# Patient Record
Sex: MALE | Race: WHITE | NOT HISPANIC OR LATINO | Employment: FULL TIME | URBAN - METROPOLITAN AREA
[De-identification: names, ages, dates, MRNs, and addresses within clinical notes are randomized per-mention and may not be internally consistent; named-entity substitution may affect disease eponyms.]

---

## 2023-03-13 DIAGNOSIS — Z00.00 ENCOUNTER FOR GENERAL ADULT MEDICAL EXAMINATION WITHOUT ABNORMAL FINDINGS: ICD-10-CM

## 2023-03-14 RX ORDER — TRAZODONE HYDROCHLORIDE 50 MG/1
TABLET ORAL
Qty: 180 TABLET | Refills: 1 | Status: SHIPPED | OUTPATIENT
Start: 2023-03-14 | End: 2023-09-11

## 2023-03-18 LAB
ALANINE AMINOTRANSFERASE (SGPT) (U/L) IN SER/PLAS: 22 U/L (ref 10–52)
ALBUMIN (G/DL) IN SER/PLAS: 4.7 G/DL (ref 3.4–5)
ALKALINE PHOSPHATASE (U/L) IN SER/PLAS: 62 U/L (ref 33–120)
ANION GAP IN SER/PLAS: 12 MMOL/L (ref 10–20)
ASPARTATE AMINOTRANSFERASE (SGOT) (U/L) IN SER/PLAS: 22 U/L (ref 9–39)
BILIRUBIN TOTAL (MG/DL) IN SER/PLAS: 0.9 MG/DL (ref 0–1.2)
CALCIUM (MG/DL) IN SER/PLAS: 9.4 MG/DL (ref 8.6–10.3)
CARBON DIOXIDE, TOTAL (MMOL/L) IN SER/PLAS: 31 MMOL/L (ref 21–32)
CHLORIDE (MMOL/L) IN SER/PLAS: 103 MMOL/L (ref 98–107)
CHOLESTEROL (MG/DL) IN SER/PLAS: 109 MG/DL (ref 0–199)
CHOLESTEROL IN HDL (MG/DL) IN SER/PLAS: 52.2 MG/DL
CHOLESTEROL/HDL RATIO: 2.1
CREATININE (MG/DL) IN SER/PLAS: 1.01 MG/DL (ref 0.5–1.3)
ERYTHROCYTE DISTRIBUTION WIDTH (RATIO) BY AUTOMATED COUNT: 12.7 % (ref 11.5–14.5)
ERYTHROCYTE MEAN CORPUSCULAR HEMOGLOBIN CONCENTRATION (G/DL) BY AUTOMATED: 33.6 G/DL (ref 32–36)
ERYTHROCYTE MEAN CORPUSCULAR VOLUME (FL) BY AUTOMATED COUNT: 99 FL (ref 80–100)
ERYTHROCYTES (10*6/UL) IN BLOOD BY AUTOMATED COUNT: 4.41 X10E12/L (ref 4.5–5.9)
ESTIMATED AVERAGE GLUCOSE FOR HBA1C: 100 MG/DL
GFR MALE: 89 ML/MIN/1.73M2
GLUCOSE (MG/DL) IN SER/PLAS: 85 MG/DL (ref 74–99)
HEMATOCRIT (%) IN BLOOD BY AUTOMATED COUNT: 43.5 % (ref 41–52)
HEMOGLOBIN (G/DL) IN BLOOD: 14.6 G/DL (ref 13.5–17.5)
HEMOGLOBIN A1C/HEMOGLOBIN TOTAL IN BLOOD: 5.1 %
LDL: 47 MG/DL (ref 0–99)
LEUKOCYTES (10*3/UL) IN BLOOD BY AUTOMATED COUNT: 4.4 X10E9/L (ref 4.4–11.3)
PLATELETS (10*3/UL) IN BLOOD AUTOMATED COUNT: 172 X10E9/L (ref 150–450)
POTASSIUM (MMOL/L) IN SER/PLAS: 4.6 MMOL/L (ref 3.5–5.3)
PROTEIN TOTAL: 6.7 G/DL (ref 6.4–8.2)
SODIUM (MMOL/L) IN SER/PLAS: 141 MMOL/L (ref 136–145)
THYROTROPIN (MIU/L) IN SER/PLAS BY DETECTION LIMIT <= 0.05 MIU/L: 2.29 MIU/L (ref 0.44–3.98)
TRIGLYCERIDE (MG/DL) IN SER/PLAS: 49 MG/DL (ref 0–149)
UREA NITROGEN (MG/DL) IN SER/PLAS: 17 MG/DL (ref 6–23)
VLDL: 10 MG/DL (ref 0–40)

## 2023-09-08 DIAGNOSIS — Z00.00 ENCOUNTER FOR GENERAL ADULT MEDICAL EXAMINATION WITHOUT ABNORMAL FINDINGS: ICD-10-CM

## 2023-09-11 RX ORDER — TRAZODONE HYDROCHLORIDE 50 MG/1
TABLET ORAL
Qty: 180 TABLET | Refills: 1 | Status: SHIPPED | OUTPATIENT
Start: 2023-09-11 | End: 2023-09-13 | Stop reason: WASHOUT

## 2023-09-13 ENCOUNTER — OFFICE VISIT (OUTPATIENT)
Dept: PRIMARY CARE | Facility: CLINIC | Age: 53
End: 2023-09-13
Payer: COMMERCIAL

## 2023-09-13 VITALS
SYSTOLIC BLOOD PRESSURE: 110 MMHG | DIASTOLIC BLOOD PRESSURE: 64 MMHG | RESPIRATION RATE: 16 BRPM | OXYGEN SATURATION: 99 % | HEART RATE: 57 BPM | TEMPERATURE: 98.2 F | BODY MASS INDEX: 22.91 KG/M2 | WEIGHT: 146 LBS | HEIGHT: 67 IN

## 2023-09-13 DIAGNOSIS — G47.00 INSOMNIA, UNSPECIFIED TYPE: ICD-10-CM

## 2023-09-13 DIAGNOSIS — E78.2 MIXED HYPERLIPIDEMIA: ICD-10-CM

## 2023-09-13 DIAGNOSIS — F41.1 GAD (GENERALIZED ANXIETY DISORDER): Primary | ICD-10-CM

## 2023-09-13 PROCEDURE — 90471 IMMUNIZATION ADMIN: CPT | Performed by: FAMILY MEDICINE

## 2023-09-13 PROCEDURE — 99214 OFFICE O/P EST MOD 30 MIN: CPT | Performed by: FAMILY MEDICINE

## 2023-09-13 PROCEDURE — 90750 HZV VACC RECOMBINANT IM: CPT | Performed by: FAMILY MEDICINE

## 2023-09-13 PROCEDURE — 1036F TOBACCO NON-USER: CPT | Performed by: FAMILY MEDICINE

## 2023-09-13 RX ORDER — ATORVASTATIN CALCIUM 20 MG/1
1 TABLET, FILM COATED ORAL NIGHTLY
COMMUNITY
Start: 2017-02-22

## 2023-09-13 RX ORDER — TRAZODONE HYDROCHLORIDE 50 MG/1
50 TABLET ORAL NIGHTLY PRN
COMMUNITY
End: 2023-09-13 | Stop reason: ALTCHOICE

## 2023-09-13 RX ORDER — TRAZODONE HYDROCHLORIDE 100 MG/1
100 TABLET ORAL NIGHTLY PRN
Qty: 90 TABLET | Refills: 3 | Status: SHIPPED | OUTPATIENT
Start: 2023-09-13 | End: 2024-09-12

## 2023-09-13 RX ORDER — TRAZODONE HYDROCHLORIDE 50 MG/1
50 TABLET ORAL NIGHTLY PRN
Qty: 180 TABLET | Refills: 3 | Status: CANCELLED | OUTPATIENT
Start: 2023-09-13

## 2023-09-13 RX ORDER — SERTRALINE HYDROCHLORIDE 50 MG/1
50 TABLET, FILM COATED ORAL DAILY
Qty: 30 TABLET | Refills: 5 | Status: SHIPPED | OUTPATIENT
Start: 2023-09-13 | End: 2024-03-11

## 2023-09-13 RX ORDER — METOPROLOL SUCCINATE 25 MG/1
25 TABLET, EXTENDED RELEASE ORAL DAILY
COMMUNITY
End: 2024-04-15

## 2023-09-13 ASSESSMENT — ANXIETY QUESTIONNAIRES
IF YOU CHECKED OFF ANY PROBLEMS ON THIS QUESTIONNAIRE, HOW DIFFICULT HAVE THESE PROBLEMS MADE IT FOR YOU TO DO YOUR WORK, TAKE CARE OF THINGS AT HOME, OR GET ALONG WITH OTHER PEOPLE: SOMEWHAT DIFFICULT
3. WORRYING TOO MUCH ABOUT DIFFERENT THINGS: NOT AT ALL
GAD7 TOTAL SCORE: 9
1. FEELING NERVOUS, ANXIOUS, OR ON EDGE: NEARLY EVERY DAY
7. FEELING AFRAID AS IF SOMETHING AWFUL MIGHT HAPPEN: NOT AT ALL
5. BEING SO RESTLESS THAT IT IS HARD TO SIT STILL: MORE THAN HALF THE DAYS
4. TROUBLE RELAXING: NEARLY EVERY DAY
2. NOT BEING ABLE TO STOP OR CONTROL WORRYING: SEVERAL DAYS
6. BECOMING EASILY ANNOYED OR IRRITABLE: NOT AT ALL

## 2023-09-13 ASSESSMENT — PATIENT HEALTH QUESTIONNAIRE - PHQ9
SUM OF ALL RESPONSES TO PHQ QUESTIONS 1-9: 3
8. MOVING OR SPEAKING SO SLOWLY THAT OTHER PEOPLE COULD HAVE NOTICED. OR THE OPPOSITE, BEING SO FIGETY OR RESTLESS THAT YOU HAVE BEEN MOVING AROUND A LOT MORE THAN USUAL: NOT AT ALL
9. THOUGHTS THAT YOU WOULD BE BETTER OFF DEAD, OR OF HURTING YOURSELF: NOT AT ALL
3. TROUBLE FALLING OR STAYING ASLEEP OR SLEEPING TOO MUCH: NEARLY EVERY DAY
10. IF YOU CHECKED OFF ANY PROBLEMS, HOW DIFFICULT HAVE THESE PROBLEMS MADE IT FOR YOU TO DO YOUR WORK, TAKE CARE OF THINGS AT HOME, OR GET ALONG WITH OTHER PEOPLE: NOT DIFFICULT AT ALL
1. LITTLE INTEREST OR PLEASURE IN DOING THINGS: NOT AT ALL
6. FEELING BAD ABOUT YOURSELF - OR THAT YOU ARE A FAILURE OR HAVE LET YOURSELF OR YOUR FAMILY DOWN: NOT AT ALL
2. FEELING DOWN, DEPRESSED OR HOPELESS: NOT AT ALL
SUM OF ALL RESPONSES TO PHQ9 QUESTIONS 1 AND 2: 0
7. TROUBLE CONCENTRATING ON THINGS, SUCH AS READING THE NEWSPAPER OR WATCHING TELEVISION: NOT AT ALL
5. POOR APPETITE OR OVEREATING: NOT AT ALL
4. FEELING TIRED OR HAVING LITTLE ENERGY: NOT AT ALL

## 2023-09-13 NOTE — PROGRESS NOTES
"Subjective   Himanshu Cano is a 53 y.o. male who presents for Establish Care.  HPI  PMH:  Strong Fhx CAD  RBBB-cards   Ca score 367 2016  HLD  Nml c-scope 3/2022 rpt 10 yr   Lithotripsy w stent 2018  Manager, engineers  , [employer]    Waking up anxious, severe insomnia for 6 yr and had extensive w/up w sleep studies but trazodone help significant  Feels like chest is tight, on edge, feels like he can't physically or mentally relax. Manages engineers. Tries to ex regularly which helps slightly.   Current Outpatient Medications on File Prior to Visit   Medication Sig Dispense Refill    atorvastatin (Lipitor) 20 mg tablet Take 1 tablet (20 mg) by mouth once daily at bedtime.      metoprolol succinate XL (Toprol-XL) 25 mg 24 hr tablet Take 1 tablet (25 mg) by mouth once daily.      [DISCONTINUED] traZODone (Desyrel) 50 mg tablet TAKE 1 TO 2 TABLETS BY MOUTH AT BEDTIME AS NEEDED FOR INSOMNIA 180 tablet 1    [DISCONTINUED] traZODone (Desyrel) 50 mg tablet TAKE 1 TO 2 TABLETS BY MOUTH AT BEDTIME AS NEEDED FOR INSOMNIA 180 tablet 1    [DISCONTINUED] traZODone (Desyrel) 50 mg tablet Take 1 tablet (50 mg) by mouth as needed at bedtime for sleep.       No current facility-administered medications on file prior to visit.        Patient Health Questionnaire-9 Score: 3           Objective   /64   Pulse 57   Temp 36.8 °C (98.2 °F)   Resp 16   Ht 1.702 m (5' 7\")   Wt 66.2 kg (146 lb)   SpO2 99%   BMI 22.87 kg/m²    Physical Exam  General: NAD  HEENT:NCAT, PERRLA, nml OP  Neck: no cervical GHISLAINE  Heart: RRR no murmur, no edema   Lungs: CTA b/l, no wheeze or rhonchi   GI: abd soft, nontender, nondistended.   MSK: no c/c/e. nml gait   Skin: warm and dry  Psych: cooperative, appropriate affect, slightly anxious, insight/judgement fair   Neuro: speech clear. A&Ox3  Assessment/Plan   Problem List Items Addressed This Visit    None  Visit Diagnoses       MAURICIO (generalized anxiety disorder)    -  Primary  -uncontrolled " for yrs  -start zoloft  -list therapist  -f/up 1 yr     Relevant Medications    sertraline (Zoloft) 50 mg tablet    Insomnia, unspecified type      -stable on trazodone 100, RF x 1 yr     Relevant Medications    traZODone (Desyrel) 100 mg tablet    Mixed hyperlipidemia      -stable per cards on statin  -elevated Ca score 360s, neg ischemic tests in past     Shingrix #1 today rpt 2-6 mo

## 2023-10-16 ENCOUNTER — OFFICE VISIT (OUTPATIENT)
Dept: PRIMARY CARE | Facility: CLINIC | Age: 53
End: 2023-10-16
Payer: COMMERCIAL

## 2023-10-16 VITALS
BODY MASS INDEX: 22.44 KG/M2 | SYSTOLIC BLOOD PRESSURE: 112 MMHG | TEMPERATURE: 98 F | HEART RATE: 55 BPM | OXYGEN SATURATION: 99 % | WEIGHT: 143 LBS | HEIGHT: 67 IN | DIASTOLIC BLOOD PRESSURE: 60 MMHG | RESPIRATION RATE: 16 BRPM

## 2023-10-16 DIAGNOSIS — F41.1 GAD (GENERALIZED ANXIETY DISORDER): Primary | ICD-10-CM

## 2023-10-16 DIAGNOSIS — G47.00 INSOMNIA, UNSPECIFIED TYPE: ICD-10-CM

## 2023-10-16 PROCEDURE — 99213 OFFICE O/P EST LOW 20 MIN: CPT | Performed by: FAMILY MEDICINE

## 2023-10-16 PROCEDURE — 90471 IMMUNIZATION ADMIN: CPT | Performed by: FAMILY MEDICINE

## 2023-10-16 PROCEDURE — 90686 IIV4 VACC NO PRSV 0.5 ML IM: CPT | Performed by: FAMILY MEDICINE

## 2023-10-16 PROCEDURE — 1036F TOBACCO NON-USER: CPT | Performed by: FAMILY MEDICINE

## 2023-10-16 RX ORDER — SERTRALINE HYDROCHLORIDE 25 MG/1
25 TABLET, FILM COATED ORAL DAILY
Qty: 30 TABLET | Refills: 11 | Status: SHIPPED | OUTPATIENT
Start: 2023-10-16 | End: 2024-10-15

## 2023-10-16 ASSESSMENT — PATIENT HEALTH QUESTIONNAIRE - PHQ9
9. THOUGHTS THAT YOU WOULD BE BETTER OFF DEAD, OR OF HURTING YOURSELF: NOT AT ALL
7. TROUBLE CONCENTRATING ON THINGS, SUCH AS READING THE NEWSPAPER OR WATCHING TELEVISION: NOT AT ALL
4. FEELING TIRED OR HAVING LITTLE ENERGY: NOT AT ALL
SUM OF ALL RESPONSES TO PHQ QUESTIONS 1-9: 0
5. POOR APPETITE OR OVEREATING: NOT AT ALL
8. MOVING OR SPEAKING SO SLOWLY THAT OTHER PEOPLE COULD HAVE NOTICED. OR THE OPPOSITE, BEING SO FIGETY OR RESTLESS THAT YOU HAVE BEEN MOVING AROUND A LOT MORE THAN USUAL: NOT AT ALL
1. LITTLE INTEREST OR PLEASURE IN DOING THINGS: NOT AT ALL
3. TROUBLE FALLING OR STAYING ASLEEP OR SLEEPING TOO MUCH: NOT AT ALL
SUM OF ALL RESPONSES TO PHQ9 QUESTIONS 1 AND 2: 0
10. IF YOU CHECKED OFF ANY PROBLEMS, HOW DIFFICULT HAVE THESE PROBLEMS MADE IT FOR YOU TO DO YOUR WORK, TAKE CARE OF THINGS AT HOME, OR GET ALONG WITH OTHER PEOPLE: NOT DIFFICULT AT ALL
2. FEELING DOWN, DEPRESSED OR HOPELESS: NOT AT ALL
6. FEELING BAD ABOUT YOURSELF - OR THAT YOU ARE A FAILURE OR HAVE LET YOURSELF OR YOUR FAMILY DOWN: NOT AT ALL

## 2023-10-16 ASSESSMENT — ANXIETY QUESTIONNAIRES
7. FEELING AFRAID AS IF SOMETHING AWFUL MIGHT HAPPEN: NOT AT ALL
3. WORRYING TOO MUCH ABOUT DIFFERENT THINGS: NOT AT ALL
2. NOT BEING ABLE TO STOP OR CONTROL WORRYING: NOT AT ALL
6. BECOMING EASILY ANNOYED OR IRRITABLE: NOT AT ALL
4. TROUBLE RELAXING: SEVERAL DAYS
GAD7 TOTAL SCORE: 2
1. FEELING NERVOUS, ANXIOUS, OR ON EDGE: SEVERAL DAYS
5. BEING SO RESTLESS THAT IT IS HARD TO SIT STILL: NOT AT ALL

## 2023-10-16 NOTE — PROGRESS NOTES
"Subjective   Himanshu Cano is a 53 y.o. male who presents for Follow-up (1 mo zoloft).  HPI  Started zoloft 1 mo ago, less stressed. Dwelling less. More relaxed. Less chest tightness.   Trazodone helping w sleep, much improved   Current Outpatient Medications on File Prior to Visit   Medication Sig Dispense Refill    atorvastatin (Lipitor) 20 mg tablet Take 1 tablet (20 mg) by mouth once daily at bedtime.      metoprolol succinate XL (Toprol-XL) 25 mg 24 hr tablet Take 1 tablet (25 mg) by mouth once daily.      sertraline (Zoloft) 50 mg tablet Take 1 tablet (50 mg) by mouth once daily. 30 tablet 5    traZODone (Desyrel) 100 mg tablet Take 1 tablet (100 mg) by mouth as needed at bedtime for sleep. 90 tablet 3     No current facility-administered medications on file prior to visit.        Patient Health Questionnaire-9 Score: 0           Objective   /60   Pulse 55   Temp 36.7 °C (98 °F)   Resp 16   Ht 1.702 m (5' 7\")   Wt 64.9 kg (143 lb)   SpO2 99%   BMI 22.40 kg/m²    Physical Exam  General: NAD  HEENT:NCAT, PERRLA  Heart: RRR no murmur, no edema   Lungs: CTA b/l, no wheeze or rhonchi   MSK: no c/c/e. nml gait   Skin: warm and dry  Psych: cooperative, appropriate affect  Neuro: speech clear. A&Ox3  Assessment/Plan   Problem List Items Addressed This Visit    None  Visit Diagnoses       MAURICIO (generalized anxiety disorder)    -  Primary  -better but still room for improvement   -inc zoloft from 50 to 75 mg  -f/up 1 mo     Relevant Medications    sertraline (Zoloft) 25 mg tablet    Insomnia, unspecified type      -improved w trazodone will cont             "

## 2023-11-13 ENCOUNTER — OFFICE VISIT (OUTPATIENT)
Dept: PRIMARY CARE | Facility: CLINIC | Age: 53
End: 2023-11-13
Payer: COMMERCIAL

## 2023-11-13 ENCOUNTER — APPOINTMENT (OUTPATIENT)
Dept: PRIMARY CARE | Facility: CLINIC | Age: 53
End: 2023-11-13
Payer: COMMERCIAL

## 2023-11-13 VITALS
HEIGHT: 67 IN | DIASTOLIC BLOOD PRESSURE: 70 MMHG | RESPIRATION RATE: 18 BRPM | WEIGHT: 144.6 LBS | BODY MASS INDEX: 22.7 KG/M2 | OXYGEN SATURATION: 97 % | SYSTOLIC BLOOD PRESSURE: 112 MMHG | TEMPERATURE: 98.2 F | HEART RATE: 63 BPM

## 2023-11-13 DIAGNOSIS — N20.0 KIDNEY STONES: Primary | ICD-10-CM

## 2023-11-13 DIAGNOSIS — F41.1 GAD (GENERALIZED ANXIETY DISORDER): ICD-10-CM

## 2023-11-13 DIAGNOSIS — Z23 NEED FOR SHINGLES VACCINE: ICD-10-CM

## 2023-11-13 DIAGNOSIS — Z00.00 ANNUAL PHYSICAL EXAM: ICD-10-CM

## 2023-11-13 PROCEDURE — 90750 HZV VACC RECOMBINANT IM: CPT | Performed by: FAMILY MEDICINE

## 2023-11-13 PROCEDURE — 90471 IMMUNIZATION ADMIN: CPT | Performed by: FAMILY MEDICINE

## 2023-11-13 PROCEDURE — 1036F TOBACCO NON-USER: CPT | Performed by: FAMILY MEDICINE

## 2023-11-13 PROCEDURE — 99214 OFFICE O/P EST MOD 30 MIN: CPT | Performed by: FAMILY MEDICINE

## 2023-11-13 NOTE — PROGRESS NOTES
"Subjective   Himanshu Cano is a 53 y.o. male who presents for Follow-up (4-6 wk zoloft).  HPI  Mood better, sleep better, less anxious since inc zoloft from 50 to 75 mg     Hx of severe kidney stones, wonders if he needs a scan before the end of the yr. Drinking more protein   Current Outpatient Medications on File Prior to Visit   Medication Sig Dispense Refill    atorvastatin (Lipitor) 20 mg tablet Take 1 tablet (20 mg) by mouth once daily at bedtime.      metoprolol succinate XL (Toprol-XL) 25 mg 24 hr tablet Take 1 tablet (25 mg) by mouth once daily.      sertraline (Zoloft) 25 mg tablet Take 1 tablet (25 mg) by mouth once daily. Take with zoloft 50 mg to equal 75 mg daily 30 tablet 11    sertraline (Zoloft) 50 mg tablet Take 1 tablet (50 mg) by mouth once daily. 30 tablet 5    traZODone (Desyrel) 100 mg tablet Take 1 tablet (100 mg) by mouth as needed at bedtime for sleep. 90 tablet 3     No current facility-administered medications on file prior to visit.                  Objective   /70   Pulse 63   Temp 36.8 °C (98.2 °F)   Resp 18   Ht 1.702 m (5' 7.01\")   Wt 65.6 kg (144 lb 9.6 oz)   SpO2 97%   BMI 22.64 kg/m²    Physical Exam  General: NAD  HEENT:NCAT, PERRLA, nml OP  Neck: no cervical GHISLAINE  Heart: RRR no murmur, no edema   Lungs: CTA b/l, no wheeze or rhonchi   GI: abd soft, nontender, nondistended. No flank pain   MSK: no c/c/e. nml gait   Skin: warm and dry  Psych: cooperative, appropriate affect  Neuro: speech clear. A&Ox3  Assessment/Plan   Problem List Items Addressed This Visit    None  Visit Diagnoses       Kidney stones     -since [facility] would not rec imaging. Discussed pro/cons  -UA is reasonable, which was orderes    Relevant Orders    Urinalysis with Reflex Microscopic    MAURICIO (generalized anxiety disorder)      -improved w zoloft 75 mg will cont  F/up 3-6 mo CPE w labs prior       Need for shingles vaccine      Shingrix #2 today       Annual physical exam        Relevant Orders "    CBC and Auto Differential    Comprehensive Metabolic Panel    Hemoglobin A1C    Lipid Panel    TSH with reflex to Free T4 if abnormal

## 2023-12-07 ENCOUNTER — LAB (OUTPATIENT)
Dept: LAB | Facility: LAB | Age: 53
End: 2023-12-07
Payer: COMMERCIAL

## 2023-12-07 DIAGNOSIS — N20.0 KIDNEY STONES: ICD-10-CM

## 2023-12-07 LAB
APPEARANCE UR: CLEAR
BILIRUB UR STRIP.AUTO-MCNC: NEGATIVE MG/DL
COLOR UR: YELLOW
GLUCOSE UR STRIP.AUTO-MCNC: NEGATIVE MG/DL
KETONES UR STRIP.AUTO-MCNC: NEGATIVE MG/DL
LEUKOCYTE ESTERASE UR QL STRIP.AUTO: NEGATIVE
NITRITE UR QL STRIP.AUTO: NEGATIVE
PH UR STRIP.AUTO: 5 [PH]
PROT UR STRIP.AUTO-MCNC: NEGATIVE MG/DL
RBC # UR STRIP.AUTO: NEGATIVE /UL
SP GR UR STRIP.AUTO: 1.02
UROBILINOGEN UR STRIP.AUTO-MCNC: <2 MG/DL

## 2023-12-07 PROCEDURE — 81003 URINALYSIS AUTO W/O SCOPE: CPT

## 2023-12-14 NOTE — RESULT ENCOUNTER NOTE
Pls call pt as he did not view the mychart msg.   urine test is normal. There were no signs of blood to suggest kidney stones, which is reassuring.

## 2023-12-26 ENCOUNTER — APPOINTMENT (OUTPATIENT)
Dept: PRIMARY CARE | Facility: CLINIC | Age: 53
End: 2023-12-26
Payer: COMMERCIAL

## 2024-02-28 ENCOUNTER — OFFICE VISIT (OUTPATIENT)
Dept: UROLOGY | Facility: HOSPITAL | Age: 54
End: 2024-02-28
Payer: COMMERCIAL

## 2024-02-28 DIAGNOSIS — N20.0 KIDNEY STONE: Primary | ICD-10-CM

## 2024-02-28 LAB
POC APPEARANCE, URINE: CLEAR
POC BILIRUBIN, URINE: NEGATIVE
POC BLOOD, URINE: NEGATIVE
POC COLOR, URINE: YELLOW
POC GLUCOSE, URINE: NEGATIVE MG/DL
POC KETONES, URINE: NEGATIVE MG/DL
POC LEUKOCYTES, URINE: NEGATIVE
POC NITRITE,URINE: NEGATIVE
POC PH, URINE: 7 PH
POC PROTEIN, URINE: NEGATIVE MG/DL
POC SPECIFIC GRAVITY, URINE: 1.01
POC UROBILINOGEN, URINE: 0.2 EU/DL

## 2024-02-28 PROCEDURE — 81003 URINALYSIS AUTO W/O SCOPE: CPT | Mod: 91 | Performed by: STUDENT IN AN ORGANIZED HEALTH CARE EDUCATION/TRAINING PROGRAM

## 2024-02-28 PROCEDURE — 99204 OFFICE O/P NEW MOD 45 MIN: CPT | Performed by: STUDENT IN AN ORGANIZED HEALTH CARE EDUCATION/TRAINING PROGRAM

## 2024-02-28 PROCEDURE — 99214 OFFICE O/P EST MOD 30 MIN: CPT | Performed by: STUDENT IN AN ORGANIZED HEALTH CARE EDUCATION/TRAINING PROGRAM

## 2024-02-28 PROCEDURE — 82365 CALCULUS SPECTROSCOPY: CPT | Performed by: STUDENT IN AN ORGANIZED HEALTH CARE EDUCATION/TRAINING PROGRAM

## 2024-02-28 PROCEDURE — 1036F TOBACCO NON-USER: CPT | Performed by: STUDENT IN AN ORGANIZED HEALTH CARE EDUCATION/TRAINING PROGRAM

## 2024-02-28 NOTE — PROGRESS NOTES
"  PCP  Brittany Behm, DO         CHIEF COMPLAINT:           HISTORY OF PRESENT ILLNESS:  This is a  53 y.o. y.o. who presents with a kidney stone that passed spontaneously.  Currently no pain, no flank discomfort.  No Luts.  Hx of nephrolithiasis.    Last CT on  10/12/21 showed Calcifications 2 mm and 1 mm distal left ureter. Moderate left  hydroureter and mild to moderate left hydronephrosis.            Past Medical History  He has a past medical history of Acute maxillary sinusitis, unspecified (05/19/2020), Otitis media, unspecified, left ear (10/15/2014), Pain in unspecified hip (12/15/2020), Personal history of diseases of the skin and subcutaneous tissue, and Personal history of other diseases of the respiratory system (03/17/2020).    Surgical History  He has a past surgical history that includes Rotator cuff repair (10/16/2013); Hernia repair (10/16/2013); MR angio head wo IV contrast (8/22/2013); and MR angio neck wo IV contrast (8/22/2013).     Social History  He reports that he has never smoked. He has never been exposed to tobacco smoke. He has never used smokeless tobacco. He reports current alcohol use of about 2.0 standard drinks of alcohol per week. He reports that he does not use drugs.    Family History  No family history on file.     Allergies  Patient has no known allergies.        A comprehensive 10+ review of systems was negative except for: see hpi                    PHYSICAL EXAMINATION:  BP Readings from Last 3 Encounters:   11/13/23 112/70   10/16/23 112/60   09/13/23 110/64      Wt Readings from Last 3 Encounters:   11/13/23 65.6 kg (144 lb 9.6 oz)   10/16/23 64.9 kg (143 lb)   09/13/23 66.2 kg (146 lb)      BMI: Estimated body mass index is 22.64 kg/m² as calculated from the following:    Height as of 11/13/23: 1.702 m (5' 7.01\").    Weight as of 11/13/23: 65.6 kg (144 lb 9.6 oz).  BSA: Estimated body surface area is 1.76 meters squared as calculated from the following:    Height as of " "11/13/23: 1.702 m (5' 7.01\").    Weight as of 11/13/23: 65.6 kg (144 lb 9.6 oz).    Physical Exam  Constitutional:       Appearance: Normal appearance.   HENT:      Head: Normocephalic and atraumatic.      Nose: Nose normal.   Pulmonary:      Effort: No respiratory distress.   Abdominal:      General: There is no distension.   Genitourinary:     Pubic Area: No rash.       Penis: Normal and circumcised. No hypospadias.       Testes:         Right: Mass, tenderness, swelling, testicular hydrocele or varicocele not present. Right testis is descended.         Left: Mass, tenderness, swelling, testicular hydrocele or varicocele not present. Left testis is descended.      Epididymis:      Right: Not inflamed or enlarged. No mass or tenderness.      Left: Not inflamed or enlarged. No mass or tenderness.   Neurological:      Mental Status: He is alert.     ITA normal, moderate BPH, no nodule           IMPRESSION AND PLAN:  Himanshu Cano is a 53 y.o. who presents with hx of nephrolithiasis with recently passed stone spontaneously. We had a very long and extensive discussion with the patient regarding his condition.  I discussed with him the pathophysiology, differential diagnosis, risk factor, management of ureteral stones.  Explained to him that we need a recent CT to assess for his stones. At this point I gave the patient options of management including observation which I encouraged. Explained that he has likely passed spontaneously the stone.   Patient presents and elect to proceed.    Plan  Stone analysis  CT A/P no contrast     All questions and concerns were answered and addressed.  The patient expressed understanding and agrees with the plan.     2/28/2024      "

## 2024-03-04 LAB
APPEARANCE STONE: NORMAL
COMPN STONE: NORMAL
SPECIMEN WT: 52 MG

## 2024-03-11 DIAGNOSIS — F41.1 GAD (GENERALIZED ANXIETY DISORDER): ICD-10-CM

## 2024-03-11 RX ORDER — SERTRALINE HYDROCHLORIDE 50 MG/1
50 TABLET, FILM COATED ORAL DAILY
Qty: 30 TABLET | Refills: 11 | Status: SHIPPED | OUTPATIENT
Start: 2024-03-11

## 2024-03-22 ENCOUNTER — HOSPITAL ENCOUNTER (OUTPATIENT)
Dept: RADIOLOGY | Facility: HOSPITAL | Age: 54
Discharge: HOME | End: 2024-03-22
Payer: COMMERCIAL

## 2024-03-22 DIAGNOSIS — N20.0 KIDNEY STONE: ICD-10-CM

## 2024-03-22 PROCEDURE — 74176 CT ABD & PELVIS W/O CONTRAST: CPT

## 2024-03-22 PROCEDURE — 74176 CT ABD & PELVIS W/O CONTRAST: CPT | Performed by: RADIOLOGY

## 2024-03-25 ENCOUNTER — TELEPHONE (OUTPATIENT)
Dept: UROLOGY | Facility: HOSPITAL | Age: 54
End: 2024-03-25
Payer: COMMERCIAL

## 2024-03-25 DIAGNOSIS — R93.3 ABNORMAL CT SCAN, COLON: Primary | ICD-10-CM

## 2024-03-25 NOTE — TELEPHONE ENCOUNTER
----- Message from Juan C March MD MPH sent at 3/23/2024 10:37 AM EDT -----  Regarding: FW:  He needs to see GI urgently plz.  ----- Message -----  From: Interface, Radiology Results In  Sent: 3/23/2024  10:00 AM EDT  To: Juan C March MD MPH

## 2024-04-04 ENCOUNTER — PREP FOR PROCEDURE (OUTPATIENT)
Dept: UROLOGY | Facility: HOSPITAL | Age: 54
End: 2024-04-04

## 2024-04-04 ENCOUNTER — OFFICE VISIT (OUTPATIENT)
Dept: UROLOGY | Facility: HOSPITAL | Age: 54
End: 2024-04-04
Payer: COMMERCIAL

## 2024-04-04 DIAGNOSIS — N20.0 LEFT RENAL STONE: Primary | ICD-10-CM

## 2024-04-04 DIAGNOSIS — Z12.5 SCREENING PSA (PROSTATE SPECIFIC ANTIGEN): Primary | ICD-10-CM

## 2024-04-04 PROCEDURE — 1036F TOBACCO NON-USER: CPT | Performed by: STUDENT IN AN ORGANIZED HEALTH CARE EDUCATION/TRAINING PROGRAM

## 2024-04-04 PROCEDURE — 99214 OFFICE O/P EST MOD 30 MIN: CPT | Performed by: STUDENT IN AN ORGANIZED HEALTH CARE EDUCATION/TRAINING PROGRAM

## 2024-04-04 RX ORDER — SODIUM CHLORIDE 9 MG/ML
100 INJECTION, SOLUTION INTRAVENOUS CONTINUOUS
Status: CANCELLED | OUTPATIENT
Start: 2024-04-04

## 2024-04-04 RX ORDER — CIPROFLOXACIN 2 MG/ML
400 INJECTION, SOLUTION INTRAVENOUS ONCE
Status: CANCELLED | OUTPATIENT
Start: 2024-04-04 | End: 2024-04-04

## 2024-04-04 NOTE — PROGRESS NOTES
Subjective   Patient ID: Himanshu Cano is a 53 y.o. male    HPI  53 y.o. male who with a history of kidney stones presenting for follow-up regarding bilateral renal calculi. He reports no current blockage or pain. The patient has a history of a kidney stone that was stuck during his last visit on 10/2021, and today's imaging shows stones similar in size to the previous ones. The left kidney has a 5mm stone, and the right kidney has a 4mm stone. The patient has been informed that these stones are not causing any obstruction.     The most recent CT abdomen pelvis, conducted on 3/23/2024, revealed :   1.  Bilateral nephrolithiasis as described without obstruction.  2. Nonspecific urinary bladder wall thickening.  3. Enlarged prostate gland increased since prior. Clinical correlation and follow-up advised.  4. Some areas of fold thickening throughout the colon as described with underlying neoplastic involvement not excluded, and also mild wall thickening versus incomplete distention distal colon and rectum. Follow-up with GI consultation and correlation with colonoscopy would be suggested as a means of further assessment.  5. 2 mm right lower lobe nodule.  6. Additional findings as above.       Review of Systems    All systems were reviewed. Anything negative was noted in the HPI.    Objective   Physical Exam    General: Well developed, well nourished, alert and cooperative, appears in no acute distress   Eyes: Non-injected conjunctiva, sclera clear, no proptosis   Cardiac: Extremities are warm and well perfused. No edema, cyanosis or pallor   Lungs: Breathing is easy, non-labored. Speaking in clear and complete sentences. Normal diaphragmatic movement   MSK: Ambulatory with steady gait, unassisted   Neuro: Alert and oriented to person, place, and time   Psych: Demonstrates good judgment and reason, without hallucinations, abnormal affect or abnormal behaviors   Skin: No obvious lesions, no rashes       No CVA  tenderness bilaterally   No suprapubic pain or discomfort       Past Medical History:   Diagnosis Date    Acute maxillary sinusitis, unspecified 05/19/2020    Acute maxillary sinusitis    Otitis media, unspecified, left ear 10/15/2014    Acute left otitis media    Pain in unspecified hip 12/15/2020    Hip pain    Personal history of diseases of the skin and subcutaneous tissue     History of dermatitis    Personal history of other diseases of the respiratory system 03/17/2020    History of bronchitis         Past Surgical History:   Procedure Laterality Date    HERNIA REPAIR  10/16/2013    Inguinal Hernia Repair    MR HEAD ANGIO WO IV CONTRAST  8/22/2013    MR HEAD ANGIO WO IV CONTRAST [facility]    MR NECK ANGIO WO IV CONTRAST  8/22/2013    MR NECK ANGIO WO IV CONTRAST [facility]    ROTATOR CUFF REPAIR  10/16/2013    Rotator Cuff Repair       Assessment/Plan   Bilateral nephrolithiasis    53 y.o. male who presents for the above condition, We had a very long and extensive discussion with the patient regarding the pathophysiology, differential diagnosis, risk factor, management, natural history, incidence and diagnostic work-up of the condition.     We had a very long and extensive discussion with the patient regarding his condition.  I discussed with him the pathophysiology, differential diagnosis, risk factor, management of ureteral stones. I gave the patient 3 options of management including observation given the size of the location of the stone.  Explained that he has likely chance of spontaneous passage of the stone.  We also discussed ESWL which would be appropriate for the size of the location of stone.  We discussed at length a left ureteroscopy, laser stone fragmentation, left retrograde pyelogram, left double-J stent insertion.  We discussed in detail the risk, benefit, potential complication, adverse events including hematuria, pneumaturia, pain, stent discomfort and pain, fever, chills,  infection, urosepsis, I explained to him that most likely he needs a second procedure at the first wound will be probably only a stent placement.  I explained that the second procedure would be the actual laser stone fragmentation and exchange of his stent.  Patient presents and elect to proceed.    He has been presented with two options for management: observation or proactive treatment with lithotripsy. The patient has a history of lithotripsy without stent placement, which was followed by significant pain, presumably due to stone fragments getting stuck. He is currently considering his options and will communicate his decision after further thought.     Plan:  - Schedule for lithotripsy 5/15/2024           Scribe Attestation  By signing my name below, I, Samantha Reyes   attest that this documentation has been prepared under the direction and in the presence of Dr. Juan C March

## 2024-04-12 DIAGNOSIS — I48.91 ATRIAL FIBRILLATION, UNSPECIFIED TYPE (MULTI): Primary | ICD-10-CM

## 2024-04-13 ENCOUNTER — LAB (OUTPATIENT)
Dept: LAB | Facility: LAB | Age: 54
End: 2024-04-13
Payer: COMMERCIAL

## 2024-04-13 DIAGNOSIS — Z12.5 SCREENING PSA (PROSTATE SPECIFIC ANTIGEN): ICD-10-CM

## 2024-04-13 DIAGNOSIS — Z00.00 ANNUAL PHYSICAL EXAM: ICD-10-CM

## 2024-04-13 LAB
ALBUMIN SERPL BCP-MCNC: 4.7 G/DL (ref 3.4–5)
ALP SERPL-CCNC: 63 U/L (ref 33–120)
ALT SERPL W P-5'-P-CCNC: 24 U/L (ref 10–52)
ANION GAP SERPL CALC-SCNC: 11 MMOL/L (ref 10–20)
AST SERPL W P-5'-P-CCNC: 26 U/L (ref 9–39)
BASOPHILS # BLD AUTO: 0.02 X10*3/UL (ref 0–0.1)
BASOPHILS NFR BLD AUTO: 0.5 %
BILIRUB SERPL-MCNC: 0.8 MG/DL (ref 0–1.2)
BUN SERPL-MCNC: 23 MG/DL (ref 6–23)
CALCIUM SERPL-MCNC: 9.9 MG/DL (ref 8.6–10.3)
CHLORIDE SERPL-SCNC: 103 MMOL/L (ref 98–107)
CHOLEST SERPL-MCNC: 122 MG/DL (ref 0–199)
CHOLESTEROL/HDL RATIO: 2.2
CO2 SERPL-SCNC: 31 MMOL/L (ref 21–32)
CREAT SERPL-MCNC: 1.17 MG/DL (ref 0.5–1.3)
EGFRCR SERPLBLD CKD-EPI 2021: 75 ML/MIN/1.73M*2
EOSINOPHIL # BLD AUTO: 0.11 X10*3/UL (ref 0–0.7)
EOSINOPHIL NFR BLD AUTO: 2.8 %
ERYTHROCYTE [DISTWIDTH] IN BLOOD BY AUTOMATED COUNT: 12.8 % (ref 11.5–14.5)
EST. AVERAGE GLUCOSE BLD GHB EST-MCNC: 100 MG/DL
GLUCOSE SERPL-MCNC: 93 MG/DL (ref 74–99)
HBA1C MFR BLD: 5.1 %
HCT VFR BLD AUTO: 44.9 % (ref 41–52)
HDLC SERPL-MCNC: 56 MG/DL
HGB BLD-MCNC: 14.8 G/DL (ref 13.5–17.5)
IMM GRANULOCYTES # BLD AUTO: 0.01 X10*3/UL (ref 0–0.7)
IMM GRANULOCYTES NFR BLD AUTO: 0.3 % (ref 0–0.9)
LDLC SERPL CALC-MCNC: 56 MG/DL
LYMPHOCYTES # BLD AUTO: 1.91 X10*3/UL (ref 1.2–4.8)
LYMPHOCYTES NFR BLD AUTO: 48 %
MCH RBC QN AUTO: 33.4 PG (ref 26–34)
MCHC RBC AUTO-ENTMCNC: 33 G/DL (ref 32–36)
MCV RBC AUTO: 101 FL (ref 80–100)
MONOCYTES # BLD AUTO: 0.45 X10*3/UL (ref 0.1–1)
MONOCYTES NFR BLD AUTO: 11.3 %
NEUTROPHILS # BLD AUTO: 1.48 X10*3/UL (ref 1.2–7.7)
NEUTROPHILS NFR BLD AUTO: 37.1 %
NON HDL CHOLESTEROL: 66 MG/DL (ref 0–149)
NRBC BLD-RTO: 0 /100 WBCS (ref 0–0)
PLATELET # BLD AUTO: 182 X10*3/UL (ref 150–450)
POTASSIUM SERPL-SCNC: 5.4 MMOL/L (ref 3.5–5.3)
PROT SERPL-MCNC: 6.9 G/DL (ref 6.4–8.2)
PSA SERPL-MCNC: 0.63 NG/ML
RBC # BLD AUTO: 4.43 X10*6/UL (ref 4.5–5.9)
SODIUM SERPL-SCNC: 140 MMOL/L (ref 136–145)
TRIGL SERPL-MCNC: 50 MG/DL (ref 0–149)
TSH SERPL-ACNC: 2.25 MIU/L (ref 0.44–3.98)
VLDL: 10 MG/DL (ref 0–40)
WBC # BLD AUTO: 4 X10*3/UL (ref 4.4–11.3)

## 2024-04-13 PROCEDURE — 84153 ASSAY OF PSA TOTAL: CPT

## 2024-04-13 PROCEDURE — 80053 COMPREHEN METABOLIC PANEL: CPT

## 2024-04-13 PROCEDURE — 85025 COMPLETE CBC W/AUTO DIFF WBC: CPT

## 2024-04-13 PROCEDURE — 36415 COLL VENOUS BLD VENIPUNCTURE: CPT

## 2024-04-13 PROCEDURE — 80061 LIPID PANEL: CPT

## 2024-04-13 PROCEDURE — 84443 ASSAY THYROID STIM HORMONE: CPT

## 2024-04-13 PROCEDURE — 83036 HEMOGLOBIN GLYCOSYLATED A1C: CPT

## 2024-04-15 RX ORDER — METOPROLOL SUCCINATE 25 MG/1
25 TABLET, EXTENDED RELEASE ORAL DAILY
Qty: 90 TABLET | Refills: 0 | Status: SHIPPED | OUTPATIENT
Start: 2024-04-15

## 2024-04-16 ENCOUNTER — APPOINTMENT (OUTPATIENT)
Dept: PRIMARY CARE | Facility: CLINIC | Age: 54
End: 2024-04-16
Payer: COMMERCIAL

## 2024-04-19 ENCOUNTER — APPOINTMENT (OUTPATIENT)
Dept: CARDIOLOGY | Facility: CLINIC | Age: 54
End: 2024-04-19
Payer: COMMERCIAL

## 2024-04-19 ENCOUNTER — APPOINTMENT (OUTPATIENT)
Dept: PRIMARY CARE | Facility: CLINIC | Age: 54
End: 2024-04-19
Payer: COMMERCIAL

## 2024-04-22 ENCOUNTER — OFFICE VISIT (OUTPATIENT)
Dept: PRIMARY CARE | Facility: CLINIC | Age: 54
End: 2024-04-22
Payer: COMMERCIAL

## 2024-04-22 VITALS
TEMPERATURE: 97.8 F | RESPIRATION RATE: 16 BRPM | BODY MASS INDEX: 23.7 KG/M2 | HEART RATE: 63 BPM | OXYGEN SATURATION: 98 % | WEIGHT: 151 LBS | HEIGHT: 67 IN | SYSTOLIC BLOOD PRESSURE: 125 MMHG | DIASTOLIC BLOOD PRESSURE: 75 MMHG

## 2024-04-22 DIAGNOSIS — R93.3 ABNORMAL CT SCAN, COLON: ICD-10-CM

## 2024-04-22 DIAGNOSIS — N20.0 KIDNEY STONES: ICD-10-CM

## 2024-04-22 DIAGNOSIS — D72.819 LEUKOPENIA, UNSPECIFIED TYPE: ICD-10-CM

## 2024-04-22 DIAGNOSIS — E87.5 HYPERKALEMIA: ICD-10-CM

## 2024-04-22 DIAGNOSIS — E78.2 MIXED HYPERLIPIDEMIA: ICD-10-CM

## 2024-04-22 DIAGNOSIS — R91.1 LUNG NODULE: ICD-10-CM

## 2024-04-22 DIAGNOSIS — Z00.00 ANNUAL PHYSICAL EXAM: Primary | ICD-10-CM

## 2024-04-22 DIAGNOSIS — F41.1 GAD (GENERALIZED ANXIETY DISORDER): ICD-10-CM

## 2024-04-22 DIAGNOSIS — D75.89 MACROCYTOSIS: ICD-10-CM

## 2024-04-22 PROCEDURE — 99396 PREV VISIT EST AGE 40-64: CPT | Performed by: FAMILY MEDICINE

## 2024-04-22 PROCEDURE — 1036F TOBACCO NON-USER: CPT | Performed by: FAMILY MEDICINE

## 2024-04-22 PROCEDURE — 99213 OFFICE O/P EST LOW 20 MIN: CPT | Performed by: FAMILY MEDICINE

## 2024-04-22 NOTE — PROGRESS NOTES
"Subjective   Himanshu Cano is a 53 y.o. male who presents for Follow-up and Results (Discuss labs).  HPI  PMH:  Strong Fhx CAD  RBBB-cards   Ca score 367 2016  HLD  Nml c-scope 3/2022 rpt 10 yr   Lithotripsy w stent 2018  MAURICIO-zoloft 9/2023  shingriX UTD   Lung nodules 4/2024 rpt 1 yr     Here for CPE    Passed kidney stone and uro did CT w stones and multiple other findings. Getting litho soon.     Feels on edge and hard to relax. Was doing better on zoloft but Sx have returned. Does not want to make dose adjustments.     Seeing cards for his regular f/up in may.     Doing harder workouts. Starting w new role at work today.     Drinks on weekends.     Abnml GI findings on CT.     Lung nodules seen. Never been a smoker.   Current Outpatient Medications on File Prior to Visit   Medication Sig Dispense Refill    atorvastatin (Lipitor) 20 mg tablet Take 1 tablet (20 mg) by mouth once daily at bedtime.      metoprolol succinate XL (Toprol-XL) 25 mg 24 hr tablet TAKE ONE TABLET BY MOUTH EVERY DAY 90 tablet 0    sertraline (Zoloft) 25 mg tablet Take 1 tablet (25 mg) by mouth once daily. Take with zoloft 50 mg to equal 75 mg daily 30 tablet 11    sertraline (Zoloft) 50 mg tablet TAKE ONE TABLET BY MOUTH ONCE DAILY 30 tablet 11    traZODone (Desyrel) 100 mg tablet Take 1 tablet (100 mg) by mouth as needed at bedtime for sleep. 90 tablet 3     No current facility-administered medications on file prior to visit.                  Objective   /75 (BP Location: Left arm)   Pulse 63   Temp 36.6 °C (97.8 °F)   Resp 16   Ht 1.702 m (5' 7\")   Wt 68.5 kg (151 lb)   SpO2 98%   BMI 23.65 kg/m²    Physical Exam  General: NAD  HEENT:NCAT, PERRLA, nml OP  Neck: no cervical GHISLAINE  Heart: RRR no murmur, no edema   Lungs: CTA b/l, no wheeze or rhonchi   GI: abd soft, nontender, nondistended.   MSK: no c/c/e. nml gait   Skin: warm and dry  Psych: cooperative, appropriate affect  Neuro: speech clear. A&Ox3  Assessment/Plan "   Problem List Items Addressed This Visit    None  Visit Diagnoses       Annual physical exam    -  Primary  CPE:overall doing well. Cont balanced diet/reg ex  BMI: 23  VACC: reviewed, shingrix/tdap UTD   COLON CA SCRN: UTD   LABS: reviewed w pt at goal besides aforementioned   Prostate ca scrn:  PSA nml   RTC 6 mo     MAURICIO (generalized anxiety disorder)      Slight worsening but declines dose adjustment   Cont zoloft 75   Consider mindfulness       Hyperkalemia      Mild rpt 1 mo       Relevant Orders    Basic Metabolic Panel    Abnormal CT scan, colon      GI changes seen on CT, rec GI f/up poss c-scope  RF GI       Relevant Orders    Referral to Gastroenterology    Macrocytosis      Mild   Check b12.folate w labs       Relevant Orders    Vitamin B12    Folate    Leukopenia, unspecified type      Mild rpt cbc 1 mo       Relevant Orders    CBC and Auto Differential    Lung nodule      Low risk but will rpt CT in 1 yt       Kidney stones      Upcoming litho       Mixed hyperlipidemia      Lipids excellent  Cont aotrvastatin 20   Upcoming cards appt     Back pain: likely MSK as disc buldge did not correlate w location of his Sx.

## 2024-05-01 ENCOUNTER — LAB (OUTPATIENT)
Dept: LAB | Facility: LAB | Age: 54
End: 2024-05-01
Payer: COMMERCIAL

## 2024-05-01 ENCOUNTER — PRE-ADMISSION TESTING (OUTPATIENT)
Dept: PREADMISSION TESTING | Facility: HOSPITAL | Age: 54
End: 2024-05-01
Payer: COMMERCIAL

## 2024-05-01 VITALS
WEIGHT: 145.5 LBS | BODY MASS INDEX: 23.38 KG/M2 | DIASTOLIC BLOOD PRESSURE: 59 MMHG | HEART RATE: 51 BPM | RESPIRATION RATE: 16 BRPM | SYSTOLIC BLOOD PRESSURE: 109 MMHG | HEIGHT: 66 IN | TEMPERATURE: 97 F | OXYGEN SATURATION: 99 %

## 2024-05-01 DIAGNOSIS — E87.5 HYPERKALEMIA: ICD-10-CM

## 2024-05-01 DIAGNOSIS — Z01.818 PREOP TESTING: ICD-10-CM

## 2024-05-01 DIAGNOSIS — Z01.818 PREOP TESTING: Primary | ICD-10-CM

## 2024-05-01 DIAGNOSIS — D72.819 LEUKOPENIA, UNSPECIFIED TYPE: ICD-10-CM

## 2024-05-01 DIAGNOSIS — D75.89 MACROCYTOSIS: ICD-10-CM

## 2024-05-01 LAB
ANION GAP SERPL CALC-SCNC: 11 MMOL/L (ref 10–20)
APPEARANCE UR: CLEAR
ATRIAL RATE: 50 BPM
BASOPHILS # BLD AUTO: 0.04 X10*3/UL (ref 0–0.1)
BASOPHILS NFR BLD AUTO: 0.7 %
BILIRUB UR STRIP.AUTO-MCNC: NEGATIVE MG/DL
BUN SERPL-MCNC: 25 MG/DL (ref 6–23)
CALCIUM SERPL-MCNC: 9.3 MG/DL (ref 8.6–10.3)
CHLORIDE SERPL-SCNC: 101 MMOL/L (ref 98–107)
CO2 SERPL-SCNC: 29 MMOL/L (ref 21–32)
COLOR UR: NORMAL
CREAT SERPL-MCNC: 1.06 MG/DL (ref 0.5–1.3)
EGFRCR SERPLBLD CKD-EPI 2021: 84 ML/MIN/1.73M*2
EOSINOPHIL # BLD AUTO: 0.12 X10*3/UL (ref 0–0.7)
EOSINOPHIL NFR BLD AUTO: 2.1 %
ERYTHROCYTE [DISTWIDTH] IN BLOOD BY AUTOMATED COUNT: 12.6 % (ref 11.5–14.5)
FOLATE SERPL-MCNC: >24 NG/ML
GLUCOSE SERPL-MCNC: 86 MG/DL (ref 74–99)
GLUCOSE UR STRIP.AUTO-MCNC: NORMAL MG/DL
HCT VFR BLD AUTO: 42.8 % (ref 41–52)
HGB BLD-MCNC: 14.6 G/DL (ref 13.5–17.5)
HOLD SPECIMEN: NORMAL
IMM GRANULOCYTES # BLD AUTO: 0.01 X10*3/UL (ref 0–0.7)
IMM GRANULOCYTES NFR BLD AUTO: 0.2 % (ref 0–0.9)
KETONES UR STRIP.AUTO-MCNC: NEGATIVE MG/DL
LEUKOCYTE ESTERASE UR QL STRIP.AUTO: NEGATIVE
LYMPHOCYTES # BLD AUTO: 2.07 X10*3/UL (ref 1.2–4.8)
LYMPHOCYTES NFR BLD AUTO: 35.9 %
MCH RBC QN AUTO: 33.6 PG (ref 26–34)
MCHC RBC AUTO-ENTMCNC: 34.1 G/DL (ref 32–36)
MCV RBC AUTO: 98 FL (ref 80–100)
MONOCYTES # BLD AUTO: 0.69 X10*3/UL (ref 0.1–1)
MONOCYTES NFR BLD AUTO: 12 %
NEUTROPHILS # BLD AUTO: 2.83 X10*3/UL (ref 1.2–7.7)
NEUTROPHILS NFR BLD AUTO: 49.1 %
NITRITE UR QL STRIP.AUTO: NEGATIVE
NRBC BLD-RTO: 0 /100 WBCS (ref 0–0)
P AXIS: 66 DEGREES
P OFFSET: 174 MS
P ONSET: 115 MS
PH UR STRIP.AUTO: 5.5 [PH]
PLATELET # BLD AUTO: 185 X10*3/UL (ref 150–450)
POTASSIUM SERPL-SCNC: 4.3 MMOL/L (ref 3.5–5.3)
PR INTERVAL: 208 MS
PROT UR STRIP.AUTO-MCNC: NEGATIVE MG/DL
Q ONSET: 219 MS
QRS COUNT: 8 BEATS
QRS DURATION: 130 MS
QT INTERVAL: 440 MS
QTC CALCULATION(BAZETT): 401 MS
QTC FREDERICIA: 414 MS
R AXIS: 85 DEGREES
RBC # BLD AUTO: 4.35 X10*6/UL (ref 4.5–5.9)
RBC # UR STRIP.AUTO: NEGATIVE /UL
SODIUM SERPL-SCNC: 137 MMOL/L (ref 136–145)
SP GR UR STRIP.AUTO: 1.01
T AXIS: 26 DEGREES
T OFFSET: 439 MS
UROBILINOGEN UR STRIP.AUTO-MCNC: NORMAL MG/DL
VENTRICULAR RATE: 50 BPM
VIT B12 SERPL-MCNC: 477 PG/ML (ref 211–911)
WBC # BLD AUTO: 5.8 X10*3/UL (ref 4.4–11.3)

## 2024-05-01 PROCEDURE — 82607 VITAMIN B-12: CPT

## 2024-05-01 PROCEDURE — 81003 URINALYSIS AUTO W/O SCOPE: CPT

## 2024-05-01 PROCEDURE — 36415 COLL VENOUS BLD VENIPUNCTURE: CPT

## 2024-05-01 PROCEDURE — 80048 BASIC METABOLIC PNL TOTAL CA: CPT

## 2024-05-01 PROCEDURE — 85025 COMPLETE CBC W/AUTO DIFF WBC: CPT

## 2024-05-01 PROCEDURE — 87081 CULTURE SCREEN ONLY: CPT | Mod: AHULAB | Performed by: PHYSICIAN ASSISTANT

## 2024-05-01 PROCEDURE — 99203 OFFICE O/P NEW LOW 30 MIN: CPT | Performed by: PHYSICIAN ASSISTANT

## 2024-05-01 PROCEDURE — 82746 ASSAY OF FOLIC ACID SERUM: CPT

## 2024-05-01 PROCEDURE — 93005 ELECTROCARDIOGRAM TRACING: CPT | Performed by: PHYSICIAN ASSISTANT

## 2024-05-01 RX ORDER — CHLORHEXIDINE GLUCONATE ORAL RINSE 1.2 MG/ML
SOLUTION DENTAL
Qty: 473 ML | Refills: 0 | Status: SHIPPED | OUTPATIENT
Start: 2024-05-01

## 2024-05-01 ASSESSMENT — ENCOUNTER SYMPTOMS
ALLERGIC/IMMUNOLOGIC NEGATIVE: 1
MUSCULOSKELETAL NEGATIVE: 1
CARDIOVASCULAR NEGATIVE: 1
HEMATOLOGIC/LYMPHATIC NEGATIVE: 1
RESPIRATORY NEGATIVE: 1
EYES NEGATIVE: 1
GASTROINTESTINAL NEGATIVE: 1
ENDOCRINE NEGATIVE: 1
CONSTITUTIONAL NEGATIVE: 1
PSYCHIATRIC NEGATIVE: 1
NEUROLOGICAL NEGATIVE: 1

## 2024-05-01 NOTE — PREPROCEDURE INSTRUCTIONS
Medication List            Accurate as of May 1, 2024  7:46 AM. Always use your most recent med list.                atorvastatin 20 mg tablet  Commonly known as: Lipitor  Medication Adjustments for Surgery: Take morning of surgery with sip of water, no other fluids     metoprolol succinate XL 25 mg 24 hr tablet  Commonly known as: Toprol-XL  TAKE ONE TABLET BY MOUTH EVERY DAY  Medication Adjustments for Surgery: Take morning of surgery with sip of water, no other fluids     * sertraline 25 mg tablet  Commonly known as: Zoloft  Take 1 tablet (25 mg) by mouth once daily. Take with zoloft 50 mg to equal 75 mg daily  Medication Adjustments for Surgery: Take morning of surgery with sip of water, no other fluids     * sertraline 50 mg tablet  Commonly known as: Zoloft  TAKE ONE TABLET BY MOUTH ONCE DAILY  Medication Adjustments for Surgery: Take morning of surgery with sip of water, no other fluids     traZODone 100 mg tablet  Commonly known as: Desyrel  Take 1 tablet (100 mg) by mouth as needed at bedtime for sleep.  Notes to patient: Continue night prior to surgery           * This list has 2 medication(s) that are the same as other medications prescribed for you. Read the directions carefully, and ask your doctor or other care provider to review them with you.                     Concerning above medication instructions - If medication is normally taken at night continue normal schedule - do not take night prior and morning of surgery.     CONTACT SURGEON'S OFFICE IF YOU DEVELOP:  * Fever = 100.4 F   * New respiratory symptoms (e.g. cough, shortness of breath, respiratory distress, sore throat)  * Recent loss of taste or smell  *Flu like symptoms such as headache, fatigue or gastrointestinal symptoms  * You develop any open sores, shingles, burning or painful urination   AND/OR:  * You no longer wish to have the surgery.  * Any other personal circumstances change that may lead to the need to cancel or defer this  surgery.  *You were admitted to any hospital within one week of your planned procedure.    SMOKING:  *Quitting smoking can make a huge difference to your health and recovery from surgery.    *If you need help with quitting, call 3-062-QUIT-NOW.    THE DAY BEFORE SURGERY:  *Do not eat any food after midnight the night before surgery/procedure.   *You are permitted to drink clear liquids (i.e. water, black coffee/tea, (no milk or cream) apple juice, and electrolyte drinks (Gatorade)13.5 ounces up to 2 hours before your instructed arrival time to the hospital.  *You may chew gum until  2 hours before your surgery/procedure.    SURGICAL TIME  *You will be contacted between 2 p.m. and 6 p.m. the business day before your surgery with your arrival time.  *If you haven't received a call by 6pm, call [phone].  *Scheduled surgery times may change and you will be notified if this occurs-check your personal voicemail for any updates.    ON THE MORNING OF SURGERY:  *Wear comfortable, loose fitting clothing.   *Do not use moisturizers, creams, lotions or perfume.  *All jewelry and valuables should be left at home.  *Prosthetic devices such as contact lenses, hearing aids, dentures, eyelash extensions, hairpins and body piercing must be removed before surgery.    BRING WITH YOU:  *Photo ID and insurance card  *Current list of medicines and allergies  *Pacemaker/Defibrillator/Heart stent cards  *CPAP machine and mask  *Slings/splints/crutches  *Copy of your complete Advanced Directive/DHPOA-if applicable  *Neurostimulator implant remote    PARKING AND ARRIVAL:  *Check in at the Main Entrance desk and let them know you are here for surgery.  *You will be directed to the 2nd floor surgical waiting area.    AFTER OUTPATIENT SURGERY:  *A responsible adult MUST accompany you at the time of discharge and stay with you for 24 hours after your surgery.  *You may NOT drive yourself home after surgery.  *You may use a taxi or ride  sharing service (Lyft, Uber) to return home ONLY if you are accompanied by a friend or family member.  *Instructions for resuming your medications will be provided by your surgeon.

## 2024-05-01 NOTE — CPM/PAT H&P
[facility] Evaluation       Name: Himanshu Cano (Himanshu Cano)  /Age: 1970/53 y.o.     In-Person       Chief Complaint: Left renal stone     HPI  Date of Consult: 24    Referring Provider: Dr. Berg    Surgery, Date, and Length: Left Extracorporeal Shock Wave Lithotripsy ; 5/15/24; 90 minutes     Himanshu Cano  is a 53 year-old male who presents to the [facility] for perioperative risk assessment prior to surgery.  Patient with a history of kidney stones presenting for follow-up regarding bilateral renal calculi. He reports no current blockage or pain. The patient has a history of a kidney stone that was stuck during his last visit on 10/2021, The left kidney has a 5mm stone, and the right kidney has a 4mm stone.     This note was created in part upon personal review of patient's medical records.      Patient is scheduled to have Left Extracorporeal Shock Wave Lithotripsy     Medical History  Past Medical History:   Diagnosis Date    Abnormal Heart Score CT     16 was 367 -  f/u NST  normal    BPH (benign prostatic hyperplasia)     MAURICIO (generalized anxiety disorder)     HLD (hyperlipidemia)     mild, on statin    Insomnia     Kidney stones     ONEIDA (obstructive sleep apnea)     mouthpiece no CPAP    Palpitations     managed on Metoprolol per cardiology note 23 with Sarah Beth Downing    Right bundle branch block         STOP BANG =  2   ( male, >49 yo )    Caprini =  3   (  age, surgery )    Surgical History  Past Surgical History:   Procedure Laterality Date    HERNIA REPAIR Left     inguinal    LITHOTRIPSY  2018    MR HEAD ANGIO WO IV CONTRAST  2013    MR HEAD ANGIO WO IV CONTRAST [facility]    MR NECK ANGIO WO IV CONTRAST  2013    MR NECK ANGIO WO IV CONTRAST [facility]    ROTATOR CUFF REPAIR Right              Family history:  Family History   Problem Relation Name Age of Onset    Heart attack Mother      Other (cva) Father          Social  "history:  Social History     Socioeconomic History    Marital status:      Spouse name: Not on file    Number of children: Not on file    Years of education: Not on file    Highest education level: Not on file   Occupational History    Not on file   Tobacco Use    Smoking status: Never     Passive exposure: Never    Smokeless tobacco: Never   Vaping Use    Vaping status: Never Used   Substance and Sexual Activity    Alcohol use: Yes     Alcohol/week: 2.0 standard drinks of alcohol     Types: 2 Standard drinks or equivalent per week    Drug use: Never    Sexual activity: Not on file   Other Topics Concern    Not on file   Social History Narrative    Not on file     Social Determinants of Health     Financial Resource Strain: Not on file   Food Insecurity: Not on file   Transportation Needs: Not on file   Physical Activity: Not on file   Stress: Not on file   Social Connections: Not on file   Intimate Partner Violence: Not on file   Housing Stability: Not on file          Current Outpatient Medications:     atorvastatin (Lipitor) 20 mg tablet, Take 1 tablet (20 mg) by mouth once daily at bedtime., Disp: , Rfl:     metoprolol succinate XL (Toprol-XL) 25 mg 24 hr tablet, TAKE ONE TABLET BY MOUTH EVERY DAY, Disp: 90 tablet, Rfl: 0    sertraline (Zoloft) 25 mg tablet, Take 1 tablet (25 mg) by mouth once daily. Take with zoloft 50 mg to equal 75 mg daily, Disp: 30 tablet, Rfl: 11    sertraline (Zoloft) 50 mg tablet, TAKE ONE TABLET BY MOUTH ONCE DAILY, Disp: 30 tablet, Rfl: 11    traZODone (Desyrel) 100 mg tablet, Take 1 tablet (100 mg) by mouth as needed at bedtime for sleep., Disp: 90 tablet, Rfl: 3    chlorhexidine (Peridex) 0.12 % solution, 15 ml swish and spit for 30 seconds night prior to surgery and morning of surgery, Disp: 473 mL, Rfl: 0         Visit Vitals  /59   Pulse 51   Temp 36.1 °C (97 °F)   Resp 16   Ht 1.685 m (5' 6.34\")   Wt 66 kg (145 lb 8.1 oz)   SpO2 99%   BMI 23.25 kg/m²   Smoking Status " Never   BSA 1.76 m²        Review of Systems   Constitutional: Negative.    HENT: Negative.     Eyes: Negative.    Respiratory: Negative.     Cardiovascular: Negative.         METS >4   regular exercise regime    Gastrointestinal: Negative.    Endocrine: Negative.    Genitourinary: Negative.    Musculoskeletal: Negative.    Skin: Negative.    Allergic/Immunologic: Negative.    Neurological: Negative.    Hematological: Negative.    Psychiatric/Behavioral: Negative.          Physical Exam  Constitutional:       Appearance: Normal appearance.   HENT:      Head: Normocephalic and atraumatic.      Right Ear: External ear normal.      Left Ear: External ear normal.      Nose: Nose normal.      Mouth/Throat:      Pharynx: Oropharynx is clear.   Eyes:      Conjunctiva/sclera: Conjunctivae normal.   Cardiovascular:      Rate and Rhythm: Normal rate and regular rhythm.      Heart sounds: Normal heart sounds.   Pulmonary:      Effort: Pulmonary effort is normal.      Breath sounds: Normal breath sounds.   Abdominal:      General: Abdomen is flat.      Palpations: Abdomen is soft.   Musculoskeletal:         General: Normal range of motion.      Cervical back: Normal range of motion and neck supple.   Skin:     General: Skin is warm and dry.   Neurological:      General: No focal deficit present.      Mental Status: He is alert and oriented to person, place, and time.   Psychiatric:         Mood and Affect: Mood normal.         Behavior: Behavior normal.         Thought Content: Thought content normal.         Judgment: Judgment normal.          PAT AIRWAY:   Airway:     Mallampati::  I    Neck ROM::  Full    Lab Results   Component Value Date    WBC 4.0 (L) 04/13/2024    HGB 14.8 04/13/2024    HCT 44.9 04/13/2024     (H) 04/13/2024     04/13/2024      Lab Results   Component Value Date    GLUCOSE 93 04/13/2024    CALCIUM 9.9 04/13/2024     04/13/2024    K 5.4 (H) 04/13/2024    CO2 31 04/13/2024      04/13/2024    BUN 23 04/13/2024    CREATININE 1.17 04/13/2024      Lab Results   Component Value Date    HGBA1C 5.1 04/13/2024      EKG 5/1/24  Sinus bradycardia @50 BPM  RBBB    NST 11/2020  IMPRESSION:  * Normal stress myocardial perfusion imaging without definite  evidence of ischemia or prior infarct.  *Well-maintained left ventricular function with an ejection fraction  of 59%.  *Normal left ventricular size.    RCRI  0  , 3.9 % Risk of MACE    Cardiac  HTN - continue metoprolol DOS   HLD - continue atorvastatin DOS     Hematology       Patient instructed to ambulate as soon as possible postoperatively to decrease thromboembolic risk.      Initiate mechanical DVT prophylaxis as soon as possible and initiate chemical prophylaxis when deemed safe from a bleeding standpoint post surgery.       VTE prophylaxis per surgical team       Tests ordered in PAT: UA, mrsa, EKG   LABS REVIEWED from 4/13/24: hyperkalemia 5.4 otherwise unremarkable     Risk assessment complete.  Patient is scheduled for a low/intermediate surgical risk procedure.        Preoperative medication instructions were provided and reviewed with the patient.  Any additional testing or evaluation was explained to the patient.  Nothing by mouth instructions were discussed and patient's questions were answered prior to conclusion to this encounter.  Patient verbalized understanding of preoperative instructions given in preadmission testing; discharge instructions available in EMR.    This note was dictated by a speech recognition.  Minor errors may have been detected in a speech recognition.

## 2024-05-03 LAB — STAPHYLOCOCCUS SPEC CULT: NORMAL

## 2024-05-15 ENCOUNTER — HOSPITAL ENCOUNTER (OUTPATIENT)
Facility: HOSPITAL | Age: 54
Setting detail: OUTPATIENT SURGERY
Discharge: HOME | End: 2024-05-15
Attending: STUDENT IN AN ORGANIZED HEALTH CARE EDUCATION/TRAINING PROGRAM | Admitting: STUDENT IN AN ORGANIZED HEALTH CARE EDUCATION/TRAINING PROGRAM
Payer: COMMERCIAL

## 2024-05-15 VITALS
WEIGHT: 146.83 LBS | HEIGHT: 66 IN | HEART RATE: 54 BPM | DIASTOLIC BLOOD PRESSURE: 69 MMHG | OXYGEN SATURATION: 99 % | RESPIRATION RATE: 16 BRPM | BODY MASS INDEX: 23.6 KG/M2 | TEMPERATURE: 97.9 F | SYSTOLIC BLOOD PRESSURE: 118 MMHG

## 2024-05-15 RX ORDER — SODIUM CHLORIDE 9 MG/ML
100 INJECTION, SOLUTION INTRAVENOUS CONTINUOUS
Status: DISCONTINUED | OUTPATIENT
Start: 2024-05-15 | End: 2024-05-24 | Stop reason: HOSPADM

## 2024-05-15 RX ORDER — CIPROFLOXACIN 2 MG/ML
400 INJECTION, SOLUTION INTRAVENOUS ONCE
Status: DISCONTINUED | OUTPATIENT
Start: 2024-05-15 | End: 2024-05-24 | Stop reason: HOSPADM

## 2024-05-15 RX ORDER — ASPIRIN 81 MG/1
81 TABLET ORAL DAILY
COMMUNITY

## 2024-05-15 ASSESSMENT — PAIN - FUNCTIONAL ASSESSMENT: PAIN_FUNCTIONAL_ASSESSMENT: 0-10

## 2024-05-15 ASSESSMENT — COLUMBIA-SUICIDE SEVERITY RATING SCALE - C-SSRS
6. HAVE YOU EVER DONE ANYTHING, STARTED TO DO ANYTHING, OR PREPARED TO DO ANYTHING TO END YOUR LIFE?: NO
2. HAVE YOU ACTUALLY HAD ANY THOUGHTS OF KILLING YOURSELF?: NO
1. IN THE PAST MONTH, HAVE YOU WISHED YOU WERE DEAD OR WISHED YOU COULD GO TO SLEEP AND NOT WAKE UP?: NO

## 2024-05-15 ASSESSMENT — PAIN SCALES - GENERAL: PAINLEVEL_OUTOF10: 0 - NO PAIN

## 2024-05-28 ENCOUNTER — ANESTHESIA EVENT (OUTPATIENT)
Dept: OPERATING ROOM | Facility: HOSPITAL | Age: 54
End: 2024-05-28
Payer: COMMERCIAL

## 2024-05-28 ENCOUNTER — ANESTHESIA (OUTPATIENT)
Dept: OPERATING ROOM | Facility: HOSPITAL | Age: 54
End: 2024-05-28
Payer: COMMERCIAL

## 2024-05-28 ENCOUNTER — HOSPITAL ENCOUNTER (OUTPATIENT)
Facility: HOSPITAL | Age: 54
Setting detail: OUTPATIENT SURGERY
Discharge: HOME | End: 2024-05-28
Attending: STUDENT IN AN ORGANIZED HEALTH CARE EDUCATION/TRAINING PROGRAM | Admitting: STUDENT IN AN ORGANIZED HEALTH CARE EDUCATION/TRAINING PROGRAM
Payer: COMMERCIAL

## 2024-05-28 VITALS
DIASTOLIC BLOOD PRESSURE: 82 MMHG | RESPIRATION RATE: 12 BRPM | BODY MASS INDEX: 23.18 KG/M2 | WEIGHT: 147.71 LBS | HEART RATE: 48 BPM | OXYGEN SATURATION: 98 % | SYSTOLIC BLOOD PRESSURE: 122 MMHG | HEIGHT: 67 IN | TEMPERATURE: 97.2 F

## 2024-05-28 DIAGNOSIS — N20.0 LEFT RENAL STONE: Primary | ICD-10-CM

## 2024-05-28 PROCEDURE — 2500000004 HC RX 250 GENERAL PHARMACY W/ HCPCS (ALT 636 FOR OP/ED): Performed by: ANESTHESIOLOGIST ASSISTANT

## 2024-05-28 PROCEDURE — 50590 FRAGMENTING OF KIDNEY STONE: CPT | Performed by: STUDENT IN AN ORGANIZED HEALTH CARE EDUCATION/TRAINING PROGRAM

## 2024-05-28 PROCEDURE — 3600000008 HC OR TIME - EACH INCREMENTAL 1 MINUTE - PROCEDURE LEVEL THREE: Performed by: STUDENT IN AN ORGANIZED HEALTH CARE EDUCATION/TRAINING PROGRAM

## 2024-05-28 PROCEDURE — 2500000005 HC RX 250 GENERAL PHARMACY W/O HCPCS: Performed by: ANESTHESIOLOGIST ASSISTANT

## 2024-05-28 PROCEDURE — 2500000005 HC RX 250 GENERAL PHARMACY W/O HCPCS: Performed by: STUDENT IN AN ORGANIZED HEALTH CARE EDUCATION/TRAINING PROGRAM

## 2024-05-28 PROCEDURE — 3600000003 HC OR TIME - INITIAL BASE CHARGE - PROCEDURE LEVEL THREE: Performed by: STUDENT IN AN ORGANIZED HEALTH CARE EDUCATION/TRAINING PROGRAM

## 2024-05-28 PROCEDURE — 3700000002 HC GENERAL ANESTHESIA TIME - EACH INCREMENTAL 1 MINUTE: Performed by: STUDENT IN AN ORGANIZED HEALTH CARE EDUCATION/TRAINING PROGRAM

## 2024-05-28 PROCEDURE — 7100000001 HC RECOVERY ROOM TIME - INITIAL BASE CHARGE: Performed by: STUDENT IN AN ORGANIZED HEALTH CARE EDUCATION/TRAINING PROGRAM

## 2024-05-28 PROCEDURE — 7100000002 HC RECOVERY ROOM TIME - EACH INCREMENTAL 1 MINUTE: Performed by: STUDENT IN AN ORGANIZED HEALTH CARE EDUCATION/TRAINING PROGRAM

## 2024-05-28 PROCEDURE — 3700000001 HC GENERAL ANESTHESIA TIME - INITIAL BASE CHARGE: Performed by: STUDENT IN AN ORGANIZED HEALTH CARE EDUCATION/TRAINING PROGRAM

## 2024-05-28 RX ORDER — LIDOCAINE HYDROCHLORIDE 10 MG/ML
0.1 INJECTION, SOLUTION EPIDURAL; INFILTRATION; INTRACAUDAL; PERINEURAL ONCE
Status: DISCONTINUED | OUTPATIENT
Start: 2024-05-28 | End: 2024-05-28 | Stop reason: HOSPADM

## 2024-05-28 RX ORDER — OXYCODONE HYDROCHLORIDE 5 MG/1
5 TABLET ORAL EVERY 4 HOURS PRN
Status: DISCONTINUED | OUTPATIENT
Start: 2024-05-28 | End: 2024-05-28 | Stop reason: HOSPADM

## 2024-05-28 RX ORDER — PROPOFOL 10 MG/ML
INJECTION, EMULSION INTRAVENOUS AS NEEDED
Status: DISCONTINUED | OUTPATIENT
Start: 2024-05-28 | End: 2024-05-28

## 2024-05-28 RX ORDER — ONDANSETRON HYDROCHLORIDE 2 MG/ML
INJECTION, SOLUTION INTRAVENOUS AS NEEDED
Status: DISCONTINUED | OUTPATIENT
Start: 2024-05-28 | End: 2024-05-28

## 2024-05-28 RX ORDER — SODIUM CHLORIDE, SODIUM LACTATE, POTASSIUM CHLORIDE, CALCIUM CHLORIDE 600; 310; 30; 20 MG/100ML; MG/100ML; MG/100ML; MG/100ML
INJECTION, SOLUTION INTRAVENOUS CONTINUOUS PRN
Status: DISCONTINUED | OUTPATIENT
Start: 2024-05-28 | End: 2024-05-28

## 2024-05-28 RX ORDER — ONDANSETRON HYDROCHLORIDE 2 MG/ML
4 INJECTION, SOLUTION INTRAVENOUS ONCE AS NEEDED
Status: DISCONTINUED | OUTPATIENT
Start: 2024-05-28 | End: 2024-05-28 | Stop reason: HOSPADM

## 2024-05-28 RX ORDER — DIPHENHYDRAMINE HYDROCHLORIDE 50 MG/ML
12.5 INJECTION INTRAMUSCULAR; INTRAVENOUS ONCE AS NEEDED
Status: DISCONTINUED | OUTPATIENT
Start: 2024-05-28 | End: 2024-05-28 | Stop reason: HOSPADM

## 2024-05-28 RX ORDER — CIPROFLOXACIN 2 MG/ML
INJECTION, SOLUTION INTRAVENOUS AS NEEDED
Status: DISCONTINUED | OUTPATIENT
Start: 2024-05-28 | End: 2024-05-28

## 2024-05-28 RX ORDER — SODIUM CHLORIDE, SODIUM LACTATE, POTASSIUM CHLORIDE, CALCIUM CHLORIDE 600; 310; 30; 20 MG/100ML; MG/100ML; MG/100ML; MG/100ML
100 INJECTION, SOLUTION INTRAVENOUS CONTINUOUS
Status: DISCONTINUED | OUTPATIENT
Start: 2024-05-28 | End: 2024-05-28 | Stop reason: HOSPADM

## 2024-05-28 RX ORDER — DEXTROMETHORPHAN HYDROBROMIDE, GUAIFENESIN 5; 100 MG/5ML; MG/5ML
650 LIQUID ORAL EVERY 8 HOURS PRN
Qty: 12 TABLET | Refills: 1 | Status: SHIPPED | OUTPATIENT
Start: 2024-05-28 | End: 2024-06-05

## 2024-05-28 RX ORDER — TAMSULOSIN HYDROCHLORIDE 0.4 MG/1
0.4 CAPSULE ORAL DAILY
Qty: 30 CAPSULE | Refills: 1 | Status: SHIPPED | OUTPATIENT
Start: 2024-05-28 | End: 2024-07-27

## 2024-05-28 RX ORDER — LABETALOL HYDROCHLORIDE 5 MG/ML
5 INJECTION, SOLUTION INTRAVENOUS ONCE AS NEEDED
Status: DISCONTINUED | OUTPATIENT
Start: 2024-05-28 | End: 2024-05-28 | Stop reason: HOSPADM

## 2024-05-28 RX ORDER — FENTANYL CITRATE 50 UG/ML
INJECTION, SOLUTION INTRAMUSCULAR; INTRAVENOUS AS NEEDED
Status: DISCONTINUED | OUTPATIENT
Start: 2024-05-28 | End: 2024-05-28

## 2024-05-28 RX ORDER — MIDAZOLAM HYDROCHLORIDE 1 MG/ML
INJECTION INTRAMUSCULAR; INTRAVENOUS AS NEEDED
Status: DISCONTINUED | OUTPATIENT
Start: 2024-05-28 | End: 2024-05-28

## 2024-05-28 RX ORDER — LIDOCAINE HYDROCHLORIDE 20 MG/ML
INJECTION, SOLUTION INFILTRATION; PERINEURAL AS NEEDED
Status: DISCONTINUED | OUTPATIENT
Start: 2024-05-28 | End: 2024-05-28

## 2024-05-28 RX ADMIN — DEXAMETHASONE SODIUM PHOSPHATE 4 MG: 4 INJECTION, SOLUTION INTRAMUSCULAR; INTRAVENOUS at 13:14

## 2024-05-28 RX ADMIN — PROPOFOL 180 MG: 10 INJECTION, EMULSION INTRAVENOUS at 13:03

## 2024-05-28 RX ADMIN — SODIUM CHLORIDE, POTASSIUM CHLORIDE, SODIUM LACTATE AND CALCIUM CHLORIDE: 600; 310; 30; 20 INJECTION, SOLUTION INTRAVENOUS at 12:35

## 2024-05-28 RX ADMIN — ONDANSETRON 4 MG: 2 INJECTION INTRAMUSCULAR; INTRAVENOUS at 13:43

## 2024-05-28 RX ADMIN — Medication 6 L/MIN: at 13:49

## 2024-05-28 RX ADMIN — MIDAZOLAM HYDROCHLORIDE 2 MG: 1 INJECTION, SOLUTION INTRAMUSCULAR; INTRAVENOUS at 12:49

## 2024-05-28 RX ADMIN — PROPOFOL 20 MG: 10 INJECTION, EMULSION INTRAVENOUS at 13:07

## 2024-05-28 RX ADMIN — FENTANYL CITRATE 50 MCG: 50 INJECTION, SOLUTION INTRAMUSCULAR; INTRAVENOUS at 13:03

## 2024-05-28 RX ADMIN — CIPROFLOXACIN 400 MG: 2 INJECTION, SOLUTION INTRAVENOUS at 13:07

## 2024-05-28 RX ADMIN — GLYCOPYRROLATE 0.2 MCG: 0.2 INJECTION, SOLUTION INTRAMUSCULAR; INTRAVITREAL at 13:23

## 2024-05-28 RX ADMIN — FENTANYL CITRATE 50 MCG: 50 INJECTION, SOLUTION INTRAMUSCULAR; INTRAVENOUS at 13:43

## 2024-05-28 RX ADMIN — LIDOCAINE HYDROCHLORIDE 80 MG: 20 INJECTION, SOLUTION INFILTRATION; PERINEURAL at 13:03

## 2024-05-28 ASSESSMENT — PAIN SCALES - GENERAL
PAINLEVEL_OUTOF10: 0 - NO PAIN

## 2024-05-28 ASSESSMENT — PAIN - FUNCTIONAL ASSESSMENT
PAIN_FUNCTIONAL_ASSESSMENT: 0-10

## 2024-05-28 ASSESSMENT — COLUMBIA-SUICIDE SEVERITY RATING SCALE - C-SSRS
2. HAVE YOU ACTUALLY HAD ANY THOUGHTS OF KILLING YOURSELF?: NO
6. HAVE YOU EVER DONE ANYTHING, STARTED TO DO ANYTHING, OR PREPARED TO DO ANYTHING TO END YOUR LIFE?: NO

## 2024-05-28 NOTE — H&P
53 y.o. male who with a history of kidney stones presenting for follow-up regarding bilateral renal calculi. He reports no current blockage or pain. The patient has a history of a kidney stone that was stuck during his last visit on 10/2021, and today's imaging shows stones similar in size to the previous ones. The left kidney has a 5mm stone, and the right kidney has a 4mm stone. The patient has been informed that these stones are not causing any obstruction.      The most recent CT abdomen pelvis, conducted on 3/23/2024, revealed :              1.  Bilateral nephrolithiasis as described without obstruction.  2. Nonspecific urinary bladder wall thickening.  3. Enlarged prostate gland increased since prior. Clinical correlation and follow-up advised.  4. Some areas of fold thickening throughout the colon as described with underlying neoplastic involvement not excluded, and also mild wall thickening versus incomplete distention distal colon and rectum. Follow-up with GI consultation and correlation with colonoscopy would be suggested as a means of further assessment.  5. 2 mm right lower lobe nodule.  6. Additional findings as above.        Review of Systems     All systems were reviewed. Anything negative was noted in the HPI.     Objective   Physical Exam     General: Well developed, well nourished, alert and cooperative, appears in no acute distress   Eyes: Non-injected conjunctiva, sclera clear, no proptosis   Cardiac: Extremities are warm and well perfused. No edema, cyanosis or pallor   Lungs: Breathing is easy, non-labored. Speaking in clear and complete sentences. Normal diaphragmatic movement   MSK: Ambulatory with steady gait, unassisted   Neuro: Alert and oriented to person, place, and time   Psych: Demonstrates good judgment and reason, without hallucinations, abnormal affect or abnormal behaviors   Skin: No obvious lesions, no rashes       No CVA tenderness bilaterally   No suprapubic pain or discomfort          Medical History        Past Medical History:   Diagnosis Date    Acute maxillary sinusitis, unspecified 05/19/2020     Acute maxillary sinusitis    Otitis media, unspecified, left ear 10/15/2014     Acute left otitis media    Pain in unspecified hip 12/15/2020     Hip pain    Personal history of diseases of the skin and subcutaneous tissue       History of dermatitis    Personal history of other diseases of the respiratory system 03/17/2020     History of bronchitis               Surgical History         Past Surgical History:   Procedure Laterality Date    HERNIA REPAIR   10/16/2013     Inguinal Hernia Repair    MR HEAD ANGIO WO IV CONTRAST   8/22/2013     MR HEAD ANGIO WO IV CONTRAST [facility]    MR NECK ANGIO WO IV CONTRAST   8/22/2013     MR NECK ANGIO WO IV CONTRAST [facility]    ROTATOR CUFF REPAIR   10/16/2013     Rotator Cuff Repair               Assessment/Plan   Bilateral nephrolithiasis     53 y.o. male who presents for the above condition, We had a very long and extensive discussion with the patient regarding the pathophysiology, differential diagnosis, risk factor, management, natural history, incidence and diagnostic work-up of the condition.      We had a very long and extensive discussion with the patient regarding his condition.  I discussed with him the pathophysiology, differential diagnosis, risk factor, management of ureteral stones. I gave the patient 3 options of management including observation given the size of the location of the stone.  Explained that he has likely chance of spontaneous passage of the stone.  We also discussed ESWL which would be appropriate for the size of the location of stone.  We discussed at length a left ureteroscopy, laser stone fragmentation, left retrograde pyelogram, left double-J stent insertion.  We discussed in detail the risk, benefit, potential complication, adverse events including hematuria, pneumaturia, pain, stent discomfort  and pain, fever, chills, infection, urosepsis, I explained to him that most likely he needs a second procedure at the first wound will be probably only a stent placement.  I explained that the second procedure would be the actual laser stone fragmentation and exchange of his stent.  Patient presents and elect to proceed.     He has been presented with two options for management: observation or proactive treatment with lithotripsy. The patient has a history of lithotripsy without stent placement, which was followed by significant pain, presumably due to stone fragments getting stuck. He is currently decided to proceed with left ESWL     Plan:  -Left ESWL

## 2024-05-28 NOTE — OP NOTE
Left Extracorporeal Shock Wave Lithotripsy (L) Operative Note     Date: 2024  OR Location: [facility] OR    Name: Himanshu Cano, : 1970, Age: 53 y.o., MRN: [id number], Sex: male    Diagnosis  Pre-op Diagnosis     * Left renal stone [N20.0] Post-op Diagnosis     * Left renal stone [N20.0]     Procedures  Left Extracorporeal Shock Wave Lithotripsy  66199 - SD LITHOTRIPSY XTRCORP SHOCK WAVE      Surgeons      * Juan C March - Primary    Resident/Fellow/Other Assistant:  Surgeons and Role:  * No surgeons found with a matching role *    Procedure Summary  Anesthesia: General  ASA: II  Anesthesia Staff: Anesthesiologist: Lamonte Cabrera MD  Estimated Blood Loss: 0mL  Intra-op Medications: Administrations occurring from 1305 to 1420 on 24:  * No intraprocedure medications in log *           Anesthesia Record               Intraprocedure I/O Totals       None           Specimen: No specimens collected     Staff:   Relief Circulator: Leonor Peng Person: Jovany  Circulator: Rachel         Drains and/or Catheters: * None in log *    Tourniquet Times:         Implants:     Findings:     Indications: Himanshu Cano is an 53 y.o. male who is having surgery for Left renal stone [N20.0].     The patient was seen in the preoperative area. The risks, benefits, complications, treatment options, non-operative alternatives, expected recovery and outcomes were discussed with the patient. The possibilities of reaction to medication, pulmonary aspiration, injury to surrounding structures, bleeding, recurrent infection, the need for additional procedures, failure to diagnose a condition, and creating a complication requiring transfusion or operation were discussed with the patient. The patient concurred with the proposed plan, giving informed consent.  The site of surgery was properly noted/marked if necessary per policy. The patient has been actively warmed in preoperative area. Preoperative antibiotics  have been ordered and given within 1 hours of incision. Venous thrombosis prophylaxis have been ordered including bilateral sequential compression devices    Procedure Details:     Patient was met in the preoperative area and consented to the procedure. The patient was taken back to the operating room and transferred to the operating table in supine position.  Allergies were reviewed. Preoperative antibiotics were administered. A timeout was performed and all were in agreement. Patient underwent general anesthesia without complication.     We then used fluoroscopy to identify a calcific density overlying the location of the left kidney..  At this point, the patient was then induced under anesthesia.  Appropriate antibiotics were administered at this time.  We then repositioned the patient in the correct positioning over the Dornier Lithotripter.  Again, the stone was already previously identified and this was localized and targeted for therapy.  We began the procedure by initiating lithotripsy at a rate of 70 shocks per minute.  We did perform a 2-minute pause after approximately the first 300 shocks.  The power was progressively increased and ramped from 1 to 8 and we did increase the rate up to 80 shocks per minute.  A total of 2500 shocks were delivered at the calcific density until the stone was completely radiolucent. Some fragments of the stone went into the upper pole of the kidney.  This concluded the procedure and the patient was awoken from general anesthesia and transferred to the PACU in stable condition.     he will fu with me in 4 months with renal US    Complications:  None; patient tolerated the procedure well.    Disposition: PACU - hemodynamically stable.  Condition: stable           Attending Attestation:     Juan C March  Phone Number: [phone]

## 2024-05-28 NOTE — ANESTHESIA PROCEDURE NOTES
Airway  Date/Time: 5/28/2024 1:05 PM  Urgency: elective    Airway not difficult    Staffing  Performed: ANTHONY   Authorized by: Lamonte Cabrera MD    Performed by: ANTHONY Leal  Patient location during procedure: OR    Indications and Patient Condition  Indications for airway management: anesthesia  Spontaneous Ventilation: absent  Sedation level: deep  Patient position: sniffing  Mask difficulty assessment: 1 - vent by mask    Final Airway Details  Final airway type: supraglottic airway      Successful airway: Size 4     Number of attempts at approach: 1  Number of other approaches attempted: 0

## 2024-05-28 NOTE — INTERVAL H&P NOTE
H&P reviewed. The patient was examined and there are no changes to the H&P.    We discussed in detail the risk, benefit, potential complication, adverse events of ESWL, including hematuria, pneumaturia, pain, the need for a subsequent stent, renal bleeding, subcapsular hematoma,  discomfort and pain, fever, chills, infection, urosepsis, I explained to him that he might needs a second procedure in the form actual laser stone fragmentation and  stent in case he has fragments stuck in his ureter.  Patient presents and elect to proceed.

## 2024-05-28 NOTE — ANESTHESIA PREPROCEDURE EVALUATION
Patient: Himanshu Cano    Procedure Information       Date/Time: 05/28/24 1305    Procedure: Left Extracorporeal Shock Wave Lithotripsy (Left)    Location: [facility] OR [facility] / [facility]    Surgeons: Juan C March MD MPH            Relevant Problems   /Renal   (+) Left renal stone       Clinical information reviewed:   Tobacco  Allergies  Meds   Med Hx  Surg Hx   Fam Hx  Soc Hx        NPO Detail:  NPO/Void Status  Carbohydrate Drink Given Prior to Surgery? : N  Date of Last Liquid: 05/28/24  Time of Last Liquid: 0930  Date of Last Solid: 05/27/24  Time of Last Solid: 2300  Last Intake Type: Clear fluids (black coffee)  Time of Last Void: 1145         Physical Exam    Airway  Mallampati: II  TM distance: >3 FB  Neck ROM: full     Cardiovascular   Rhythm: regular  Rate: normal     Dental    Pulmonary   Breath sounds clear to auscultation     Abdominal            Anesthesia Plan    History of general anesthesia?: yes  History of complications of general anesthesia?: no    ASA 2     general     intravenous induction   Anesthetic plan and risks discussed with patient.    Plan discussed with CRNA.

## 2024-05-31 ENCOUNTER — APPOINTMENT (OUTPATIENT)
Dept: CARDIOLOGY | Facility: CLINIC | Age: 54
End: 2024-05-31
Payer: COMMERCIAL

## 2024-06-26 ENCOUNTER — APPOINTMENT (OUTPATIENT)
Dept: DERMATOLOGY | Facility: CLINIC | Age: 54
End: 2024-06-26
Payer: COMMERCIAL

## 2024-06-26 DIAGNOSIS — L40.9 PSORIASIS: ICD-10-CM

## 2024-06-26 DIAGNOSIS — L82.1 SEBORRHEIC KERATOSIS: ICD-10-CM

## 2024-06-26 DIAGNOSIS — L72.0 EPIDERMAL CYST: Primary | ICD-10-CM

## 2024-06-26 PROCEDURE — 1036F TOBACCO NON-USER: CPT | Performed by: NURSE PRACTITIONER

## 2024-06-26 PROCEDURE — 99213 OFFICE O/P EST LOW 20 MIN: CPT | Performed by: NURSE PRACTITIONER

## 2024-06-26 ASSESSMENT — DERMATOLOGY QUALITY OF LIFE (QOL) ASSESSMENT
RATE HOW BOTHERED YOU ARE BY EFFECTS OF YOUR SKIN PROBLEMS ON YOUR ACTIVITIES (EG, GOING OUT, ACCOMPLISHING WHAT YOU WANT, WORK ACTIVITIES OR YOUR RELATIONSHIPS WITH OTHERS): 0 - NEVER BOTHERED
RATE HOW EMOTIONALLY BOTHERED YOU ARE BY YOUR SKIN PROBLEM (FOR EXAMPLE, WORRY, EMBARRASSMENT, FRUSTRATION): 0 - NEVER BOTHERED
RATE HOW BOTHERED YOU ARE BY SYMPTOMS OF YOUR SKIN PROBLEM (EG, ITCHING, STINGING BURNING, HURTING OR SKIN IRRITATION): 1
WHAT SINGLE SKIN CONDITION LISTED BELOW IS THE PATIENT ANSWERING THE QUALITY-OF-LIFE ASSESSMENT QUESTIONS ABOUT: PSORIASIS

## 2024-06-26 ASSESSMENT — PHYSICIAN GLOBAL ASSESSMENT (PGA): WHAT IS THE PGA: PHYSICIAN GLOBAL ASSESSMENT:  2 - MILD

## 2024-06-26 ASSESSMENT — ITCH NUMERIC RATING SCALE: HOW SEVERE IS YOUR ITCHING?: 1

## 2024-06-26 ASSESSMENT — BODY SURFACE AREA (BSA): WHAT IS THE BSA PERCENTAGE: < 3% BODY SURFACE AREA INVOLVED

## 2024-06-26 ASSESSMENT — PATIENT GLOBAL ASSESSMENT (PGA): PATIENT GLOBAL ASSESSMENT: PATIENT GLOBAL ASSESSMENT:  2 - MILD

## 2024-06-26 NOTE — PROGRESS NOTES
"Subjective     Himanshu Cano is a 54 y.o. male who presents for the following: Skin Check.     Review of Systems:  No other skin or systemic complaints other than what is documented elsewhere in the note.    The following portions of the chart were reviewed this encounter and updated as appropriate:          Skin Cancer History  No skin cancer on file.      Specialty Problems          Dermatology Problems    Seborrheic dermatitis, unspecified    Sebaceous cyst    Other seborrheic keratosis    Hemangioma of skin and subcutaneous tissue    Melanocytic nevi of trunk    Melanocytic nevi of unspecified lower limb, including hip    Melanocytic nevi of unspecified upper limb, including shoulder    Neoplasm of uncertain behavior of skin    Other melanin hyperpigmentation    Psoriasis vulgaris        Objective   Well appearing patient in no apparent distress; mood and affect are within normal limits.    A focused skin examination was performed. All findings within normal limits unless otherwise noted below.    Assessment/Plan   1. Epidermal cyst  Mid Back  6 mm A dome-shaped, firm, skin-colored nodule that is freely movable on palpation with or without connecting punctum    Epidermoid cysts, sometimes called sebaceous cysts, contain a soft, \"cheesy\" material composed of keratin, a protein component of skin, hair, and nails. Epidermoid cysts form when the top layer of skin (the epidermis) grows into the middle layer of the skin (the dermis). This may occur due to injury or blocked hair follicles. An epidermoid cyst may have no pain or other symptoms associated with it, but if it becomes inflamed or infected, it may grow, become painful and red, and may rupture.    The risk and benefits of excision was discussed, including scarring, infection, bleeding, incomplete excision, restricted activity and the need for additional procedures if the lesion is not completely removed. Patient would like removed.     Addendum " 12/3/2024    Patient reports: The current one is approx 1” from my spine and is 3x the size since we met in June. It gets agitated and painful whenever I sit and lean back in a chair or lay in my back.     Requesting for re-attempting PA to get coverage for cyst removal given worsening of the cyst and pain associated with it.     Related Procedures  Referral to Dermatology - Mohs Surgery  Follow Up In Dermatology - Established Patient    2. Psoriasis  Kaylee-auricular, Knees  Smooth skin. No surface change to ear area. Faint pink demarcated. <1% BSA    Patient reports skin clears up with vtama application but has noted some pimple like lesions occurring in the areas of application. He reports he stops applying vtama once lesions clear up and that has helped reduced folliculitis. Patient very happy with control. Continue with vtama once daily as needed. No RF needed at this time.     Related Procedures  Follow Up In Dermatology - Established Patient    3. Seborrheic keratosis (2)  Left Forearm - Posterior, Right Forearm - Posterior  Stuck on verrucous, tan-brown papules and plaques.      Seborrheic keratoses are common noncancerous (benign) growths of unknown cause seen in adults due to a thickening of an area of the top skin layer. Seborrheic keratoses may appear as if they are stuck on to the skin. They have distinct borders, and they may appear as papules (small, solid bumps) or plaques (solid, raised patches that are bigger than a thumbnail). They may be the same color as your skin, or they may be pink, light brown, darker brown, or very dark brown, or sometimes may appear black.    There is no way to prevent new seborrheic keratoses from forming. Seborrheic keratoses can be removed, but removal is considered a cosmetic issue and is usually not covered by insurance.    PLAN  No treatment is needed unless there is irritation from clothing, such as itching or bleeding.  2.   Some lotions containing alpha hydroxy  acids, salicylic acid, or urea may make the areas feel smoother with regular use but will not eliminate them.    Related Procedures  Follow Up In Dermatology - Established Patient    4. Pain        Return in 1 year or sooner if needed.

## 2024-06-30 DIAGNOSIS — I48.91 ATRIAL FIBRILLATION, UNSPECIFIED TYPE (MULTI): ICD-10-CM

## 2024-07-01 RX ORDER — METOPROLOL SUCCINATE 25 MG/1
25 TABLET, EXTENDED RELEASE ORAL DAILY
Qty: 90 TABLET | Refills: 0 | Status: SHIPPED | OUTPATIENT
Start: 2024-07-01

## 2024-07-03 PROBLEM — S60.459A FOREIGN BODY IN FINGER-INFECTED: Status: ACTIVE | Noted: 2024-07-03

## 2024-07-03 PROBLEM — M22.2X1 BILATERAL PATELLOFEMORAL SYNDROME: Status: ACTIVE | Noted: 2024-07-03

## 2024-07-03 PROBLEM — L40.0 PSORIASIS VULGARIS: Status: ACTIVE | Noted: 2023-02-28

## 2024-07-03 PROBLEM — L08.9 FOREIGN BODY IN FINGER-INFECTED: Status: ACTIVE | Noted: 2024-07-03

## 2024-07-03 PROBLEM — D48.5 NEOPLASM OF UNCERTAIN BEHAVIOR OF SKIN: Status: ACTIVE | Noted: 2021-07-02

## 2024-07-03 PROBLEM — J40 BRONCHITIS: Status: ACTIVE | Noted: 2024-07-03

## 2024-07-03 PROBLEM — M65.949 SYNOVITIS OF FINGER: Status: ACTIVE | Noted: 2024-07-03

## 2024-07-03 PROBLEM — M25.569 KNEE PAIN: Status: ACTIVE | Noted: 2024-07-03

## 2024-07-03 PROBLEM — L81.4 OTHER MELANIN HYPERPIGMENTATION: Status: ACTIVE | Noted: 2021-07-02

## 2024-07-03 PROBLEM — D72.819 LEUKOPENIA: Status: ACTIVE | Noted: 2024-05-01

## 2024-07-03 PROBLEM — R10.9 FLANK PAIN: Status: ACTIVE | Noted: 2024-07-03

## 2024-07-03 PROBLEM — M77.11 LATERAL EPICONDYLITIS OF RIGHT ELBOW: Status: ACTIVE | Noted: 2024-07-03

## 2024-07-03 PROBLEM — M25.861 MASS OF JOINT OF RIGHT KNEE: Status: ACTIVE | Noted: 2024-07-03

## 2024-07-03 PROBLEM — F51.01 IDIOPATHIC INSOMNIA: Status: ACTIVE | Noted: 2024-07-03

## 2024-07-03 PROBLEM — D18.01 HEMANGIOMA OF SKIN AND SUBCUTANEOUS TISSUE: Status: ACTIVE | Noted: 2021-07-02

## 2024-07-03 PROBLEM — N40.0 BPH (BENIGN PROSTATIC HYPERPLASIA): Status: ACTIVE | Noted: 2024-07-03

## 2024-07-03 PROBLEM — R53.83 FATIGUE: Status: ACTIVE | Noted: 2024-07-03

## 2024-07-03 PROBLEM — G47.33 OBSTRUCTIVE SLEEP APNEA: Status: ACTIVE | Noted: 2024-07-03

## 2024-07-03 PROBLEM — S73.109A SPRAIN OF HIP: Status: ACTIVE | Noted: 2024-07-03

## 2024-07-03 PROBLEM — M54.9 BACK PAIN: Status: ACTIVE | Noted: 2024-07-03

## 2024-07-03 PROBLEM — G47.10 EXCESSIVE SLEEPINESS: Status: ACTIVE | Noted: 2024-07-03

## 2024-07-03 PROBLEM — G47.00 INSOMNIA: Status: ACTIVE | Noted: 2024-07-03

## 2024-07-03 PROBLEM — R91.1 LUNG NODULE: Status: ACTIVE | Noted: 2024-07-03

## 2024-07-03 PROBLEM — E87.5 HYPERKALEMIA: Status: ACTIVE | Noted: 2024-05-01

## 2024-07-03 PROBLEM — M22.2X9 PATELLOFEMORAL PAIN SYNDROME: Status: ACTIVE | Noted: 2024-07-03

## 2024-07-03 PROBLEM — G47.10 HYPERSOMNIA: Status: ACTIVE | Noted: 2024-07-03

## 2024-07-03 PROBLEM — M22.2X2 BILATERAL PATELLOFEMORAL SYNDROME: Status: ACTIVE | Noted: 2024-07-03

## 2024-07-03 PROBLEM — R93.3 ABNORMAL FINDINGS ON RADIOLOGICAL EXAMINATION OF GASTROINTESTINAL TRACT: Status: ACTIVE | Noted: 2021-07-02

## 2024-07-03 PROBLEM — L08.9 INFECTION OF HAND: Status: ACTIVE | Noted: 2024-07-03

## 2024-07-03 PROBLEM — M65.9 SYNOVITIS OF FINGER: Status: ACTIVE | Noted: 2024-07-03

## 2024-07-03 PROBLEM — M67.50 SYNOVIAL PLICA SYNDROME OF KNEE: Status: ACTIVE | Noted: 2024-07-03

## 2024-07-03 PROBLEM — L72.3 SEBACEOUS CYST: Status: ACTIVE | Noted: 2017-10-20

## 2024-07-03 PROBLEM — L82.1 OTHER SEBORRHEIC KERATOSIS: Status: ACTIVE | Noted: 2019-12-16

## 2024-07-03 PROBLEM — M25.349 CHRONIC INSTABILITY OF METACARPOPHALANGEAL JOINT OF THUMB: Status: ACTIVE | Noted: 2024-07-03

## 2024-07-03 PROBLEM — I45.10 RIGHT BUNDLE BRANCH BLOCK (RBBB): Status: ACTIVE | Noted: 2024-07-03

## 2024-07-03 PROBLEM — R00.2 PALPITATIONS: Status: ACTIVE | Noted: 2024-07-03

## 2024-07-03 PROBLEM — D22.70 MELANOCYTIC NEVI OF UNSPECIFIED LOWER LIMB, INCLUDING HIP: Status: ACTIVE | Noted: 2021-07-02

## 2024-07-03 PROBLEM — M25.529 JOINT PAIN, ELBOW: Status: ACTIVE | Noted: 2024-07-03

## 2024-07-03 PROBLEM — R19.7 DIARRHEA: Status: ACTIVE | Noted: 2024-07-03

## 2024-07-03 PROBLEM — D75.89 MACROCYTOSIS: Status: ACTIVE | Noted: 2024-05-01

## 2024-07-03 PROBLEM — D22.60 MELANOCYTIC NEVI OF UNSPECIFIED UPPER LIMB, INCLUDING SHOULDER: Status: ACTIVE | Noted: 2021-07-02

## 2024-07-03 PROBLEM — E78.5 HYPERLIPIDEMIA: Status: ACTIVE | Noted: 2024-07-03

## 2024-07-03 PROBLEM — N20.1 URETERIC STONE: Status: ACTIVE | Noted: 2023-12-07

## 2024-07-03 PROBLEM — F41.9 ANXIETY: Status: ACTIVE | Noted: 2024-07-03

## 2024-07-03 PROBLEM — N40.0 BENIGN PROSTATIC HYPERPLASIA: Status: ACTIVE | Noted: 2024-07-03

## 2024-07-03 PROBLEM — M79.643 HAND PAIN: Status: ACTIVE | Noted: 2024-07-03

## 2024-07-03 PROBLEM — D22.5 MELANOCYTIC NEVI OF TRUNK: Status: ACTIVE | Noted: 2021-07-02

## 2024-07-03 PROBLEM — E66.3 OVERWEIGHT WITH BODY MASS INDEX (BMI) 25.0-29.9: Status: ACTIVE | Noted: 2024-07-03

## 2024-07-03 PROBLEM — R06.2 WHEEZING: Status: ACTIVE | Noted: 2024-07-03

## 2024-07-03 PROBLEM — M76.31 CHRONIC ILIOTIBIAL BAND SYNDROME OF RIGHT SIDE: Status: ACTIVE | Noted: 2024-07-03

## 2024-07-03 PROBLEM — R06.83 SNORING: Status: ACTIVE | Noted: 2024-07-03

## 2024-07-05 ENCOUNTER — OFFICE VISIT (OUTPATIENT)
Dept: CARDIOLOGY | Facility: CLINIC | Age: 54
End: 2024-07-05
Payer: COMMERCIAL

## 2024-07-05 VITALS
BODY MASS INDEX: 23.23 KG/M2 | HEART RATE: 57 BPM | OXYGEN SATURATION: 100 % | SYSTOLIC BLOOD PRESSURE: 115 MMHG | HEIGHT: 67 IN | DIASTOLIC BLOOD PRESSURE: 74 MMHG | WEIGHT: 148 LBS

## 2024-07-05 DIAGNOSIS — E78.5 HYPERLIPIDEMIA, UNSPECIFIED HYPERLIPIDEMIA TYPE: Primary | ICD-10-CM

## 2024-07-05 PROCEDURE — 1036F TOBACCO NON-USER: CPT | Performed by: NURSE PRACTITIONER

## 2024-07-05 PROCEDURE — 99214 OFFICE O/P EST MOD 30 MIN: CPT | Performed by: NURSE PRACTITIONER

## 2024-07-05 ASSESSMENT — ENCOUNTER SYMPTOMS
RESPIRATORY NEGATIVE: 1
GASTROINTESTINAL NEGATIVE: 1
NEUROLOGICAL NEGATIVE: 1
CONSTITUTIONAL NEGATIVE: 1
MUSCULOSKELETAL NEGATIVE: 1
CARDIOVASCULAR NEGATIVE: 1

## 2024-07-05 ASSESSMENT — PATIENT HEALTH QUESTIONNAIRE - PHQ9
1. LITTLE INTEREST OR PLEASURE IN DOING THINGS: NOT AT ALL
2. FEELING DOWN, DEPRESSED OR HOPELESS: NOT AT ALL
SUM OF ALL RESPONSES TO PHQ9 QUESTIONS 1 AND 2: 0

## 2024-07-05 ASSESSMENT — PAIN SCALES - GENERAL: PAINLEVEL: 0-NO PAIN

## 2024-07-05 NOTE — PROGRESS NOTES
"Chief Complaint:   Follow-up (Denies any chest discomfort or shortness of breath. )    History Of Present Illness:    .Mr Cano returns in follow up. Denies chest pain, sob, palpitations or pedal edema.  We will repeat stress testing later next year and CT coronary artery score in 2026.         Last Recorded Vitals:  Blood pressure 115/74, pulse 57, height 1.702 m (5' 7\"), weight 67.1 kg (148 lb), SpO2 100%.     Past Medical History:  Past Medical History:   Diagnosis Date    Abnormal Heart Score CT     2/19/16 was 367 -  f/u NST 2020 normal    BPH (benign prostatic hyperplasia)     MAURICIO (generalized anxiety disorder)     HLD (hyperlipidemia)     mild, on statin    Insomnia     Kidney stones     ONEIDA (obstructive sleep apnea)     mouthpiece no CPAP    Palpitations     managed on Metoprolol per cardiology note 7/21/23 with Sarah Beth Downing    Right bundle branch block         Past Surgical History:  Past Surgical History:   Procedure Laterality Date    EXTRACORPOREAL SHOCK WAVE LITHOTRIPSY Left 05/28/2024    [facility]    HERNIA REPAIR Left     inguinal    LITHOTRIPSY  2018    MR HEAD ANGIO WO IV CONTRAST  08/22/2013    MR HEAD ANGIO WO IV CONTRAST [facility]    MR NECK ANGIO WO IV CONTRAST  08/22/2013    MR NECK ANGIO WO IV CONTRAST [facility]    ROTATOR CUFF REPAIR Right        Social History:  Social History     Socioeconomic History    Marital status:      Spouse name: None    Number of children: None    Years of education: None    Highest education level: None   Occupational History    None   Tobacco Use    Smoking status: Never     Passive exposure: Never    Smokeless tobacco: Never   Vaping Use    Vaping status: Never Used   Substance and Sexual Activity    Alcohol use: Not Currently     Alcohol/week: 2.0 standard drinks of alcohol     Types: 2 Standard drinks or equivalent per week    Drug use: Never    Sexual activity: Defer   Other Topics Concern    None   Social History Narrative    None "     Social Determinants of Health     Financial Resource Strain: Not on file   Food Insecurity: Not on file   Transportation Needs: Not on file   Physical Activity: Not on file   Stress: Not on file   Social Connections: Not on file   Intimate Partner Violence: Not on file   Housing Stability: Not on file       Family History:  Family History   Problem Relation Name Age of Onset    Heart attack Mother      Other (cva) Father           Allergies:  Patient has no known allergies.    Outpatient Medications:  Current Outpatient Medications   Medication Sig Dispense Refill    atorvastatin (Lipitor) 20 mg tablet Take 1 tablet (20 mg) by mouth once daily at bedtime.      metoprolol succinate XL (Toprol-XL) 25 mg 24 hr tablet TAKE ONE TABLET BY MOUTH EVERY DAY 90 tablet 0    sertraline (Zoloft) 25 mg tablet Take 1 tablet (25 mg) by mouth once daily. Take with zoloft 50 mg to equal 75 mg daily 30 tablet 11    sertraline (Zoloft) 50 mg tablet TAKE ONE TABLET BY MOUTH ONCE DAILY 30 tablet 11    traZODone (Desyrel) 100 mg tablet Take 1 tablet (100 mg) by mouth as needed at bedtime for sleep. 90 tablet 3    aspirin 81 mg EC tablet Take 1 tablet (81 mg) by mouth once daily.      chlorhexidine (Peridex) 0.12 % solution 15 ml swish and spit for 30 seconds night prior to surgery and morning of surgery (Patient not taking: Reported on 7/5/2024) 473 mL 0    tamsulosin (Flomax) 0.4 mg 24 hr capsule Take 1 capsule (0.4 mg) by mouth once daily. (Patient not taking: Reported on 7/5/2024) 30 capsule 1     No current facility-administered medications for this visit.        Physical Exam:  Cardiovascular:      PMI at left midclavicular line. Normal rate. Regular rhythm. Normal S1. Normal S2.       Murmurs: There is no murmur.      No gallop.  No click. No rub.   Pulses:     Intact distal pulses.   Edema:     Peripheral edema absent.         ROS:  Review of Systems   Constitutional: Negative.   Cardiovascular: Negative.    Respiratory:  Negative.     Skin: Negative.    Musculoskeletal: Negative.    Gastrointestinal: Negative.    Genitourinary: Negative.    Neurological: Negative.           Last Labs:  CBC -  Lab Results   Component Value Date    WBC 5.8 05/01/2024    HGB 14.6 05/01/2024    HCT 42.8 05/01/2024    MCV 98 05/01/2024     05/01/2024       CMP -  Lab Results   Component Value Date    CALCIUM 9.3 05/01/2024    PROT 6.9 04/13/2024    ALBUMIN 4.7 04/13/2024    AST 26 04/13/2024    ALT 24 04/13/2024    ALKPHOS 63 04/13/2024    BILITOT 0.8 04/13/2024       LIPID PANEL -   Lab Results   Component Value Date    CHOL 122 04/13/2024    TRIG 50 04/13/2024    HDL 56.0 04/13/2024    CHHDL 2.2 04/13/2024    LDLF 47 03/18/2023    VLDL 10 04/13/2024    NHDL 66 04/13/2024       RENAL FUNCTION PANEL -   Lab Results   Component Value Date    GLUCOSE 86 05/01/2024     05/01/2024    K 4.3 05/01/2024     05/01/2024    CO2 29 05/01/2024    ANIONGAP 11 05/01/2024    BUN 25 (H) 05/01/2024    CREATININE 1.06 05/01/2024    GFRMALE 89 03/18/2023    CALCIUM 9.3 05/01/2024    ALBUMIN 4.7 04/13/2024        Lab Results   Component Value Date    HGBA1C 5.1 04/13/2024         Assessment/Plan   Problem List Items Addressed This Visit    None    1. Coronary artery disease. This patient does have a family history of coronary artery disease with his father having had stents deployed and even a CVA in his early 50s. The patient has a twin brother who had a high CT coronary calcium test and a subsequent cardiac catheterization which demonstrated low-grade coronary artery disease. The patient himself underwent an exercise treadmill stress echocardiogram on 11/13/2015 during which she was able to complete 17 minutes of the Abdirahman protocol with no evidence of ischemia. Despite these results he was encouraged also have a CT coronary calcium score test performed. This was done on 02/09/2016 with a result of 367 confirming the presence of CAD. His lipid panel  performed on 07/12/2015 included a cholesterol 178  HDL 53 triglycerides 69. Given the value of his CT coronary calcium score he was started on aspirin 81 mg daily and atorvastatin 10 mg daily despite the negativity of the stress echo. He did have a fasting lipid panel performed on 08/22/2016 with cholesterol 124 LDL 66 HDL 46 triglyceride 60. SMA panel was normal. CBC included hematocrit 39.3. TSH was 2.46 free thyroxine 1.20. The patient remains asymptomatic not having been seen for approximately 4 years. He had a negative exercise nuclear stress test done 11/2020.      2. Palpations. The patient has a history of cardiac palpitations that began back in May of 2022. The patient reported that his palpitations have improved on Metop ER of 25 MG. He will continue this medication.      3. Positive family history of CAD.     4. Mild hyperlipidemia. Given the findings of the CT coronary calcium score, the patient was started on atorvastatin 10 mg daily despite the fact that his baseline lipid readings are only modestly elevated. He did have a good response to the atorvastatin 10 mg daily as per the lab work on 08/22/2016. He had repeat lipid levels drawn 3/20/2023 resulting Total cholesterol 109, HDL of 52, LDL of 47, and TGL of 49. Continue atorvastatin 20mg.      5. Obstructive sleep apnea. The patient did have a sleep study on 06/27/2015 confirming the presence of sleep apnea.     6. Status post inguinal hernia repair     7. History of rotator cuff repair.     8. Complete right bundle branch block conduction delay.     9. Status post cystoscopy right ureteroscopy and laser lithotripsy and right ureteral stent for nephrolithiasis 1/29/2018          Sarah Beth Downing, APRN-CNP

## 2024-07-23 ENCOUNTER — TELEPHONE (OUTPATIENT)
Dept: PRIMARY CARE | Facility: CLINIC | Age: 54
End: 2024-07-23
Payer: COMMERCIAL

## 2024-07-23 ENCOUNTER — OFFICE VISIT (OUTPATIENT)
Dept: GASTROENTEROLOGY | Facility: HOSPITAL | Age: 54
End: 2024-07-23
Payer: COMMERCIAL

## 2024-07-23 VITALS — WEIGHT: 150 LBS | HEART RATE: 60 BPM | HEIGHT: 67 IN | BODY MASS INDEX: 23.54 KG/M2

## 2024-07-23 DIAGNOSIS — R93.3 ABNORMAL CT SCAN, COLON: ICD-10-CM

## 2024-07-23 PROCEDURE — 3008F BODY MASS INDEX DOCD: CPT

## 2024-07-23 PROCEDURE — 99204 OFFICE O/P NEW MOD 45 MIN: CPT

## 2024-07-23 PROCEDURE — 99214 OFFICE O/P EST MOD 30 MIN: CPT

## 2024-07-23 RX ORDER — SOD SULF/POT CHLORIDE/MAG SULF 1.479 G
TABLET ORAL
Qty: 24 TABLET | Refills: 0 | Status: SHIPPED | OUTPATIENT
Start: 2024-07-23

## 2024-07-23 ASSESSMENT — ENCOUNTER SYMPTOMS
ABDOMINAL DISTENTION: 0
COUGH: 0
SHORTNESS OF BREATH: 0
FEVER: 0
RECTAL PAIN: 0
ABDOMINAL PAIN: 0
CONSTIPATION: 0
CHILLS: 0
FATIGUE: 0
NAUSEA: 0
ANAL BLEEDING: 0
TROUBLE SWALLOWING: 0
VOMITING: 0
APPETITE CHANGE: 0
DIARRHEA: 0
BLOOD IN STOOL: 0

## 2024-07-23 NOTE — PATIENT INSTRUCTIONS
Please schedule your colonoscopy. You will need a ride since this involves sedation.  I will send a bowel prep to your pharmacy.  Clear liquid diet the day before the procedure. Start the 1st part of the prep at 6 pm the night prior and the other half 5 hrs before the procedure.    Follow up as needed
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
## 2024-07-23 NOTE — PROGRESS NOTES
Subjective     History of Present Illness:   Himanshu Cano is a 54 y.o. male with PMHx of HLD, ONEIDA, RBBB who presents to GI clinic for further evaluation CT scan results    Today, patient here for follow up of abnormal CT scan he had for kidney stones.  He denies any bowel changes or GI complaints prior to CT scan or currently.  Denies regular use of NSAIDs.  Patient states he moves bowels daily with formed brown stool.  Denies constipation, diarrhea, dyspepsia, melena, hematochezia, dysphagia, unintentional weight loss  3/2024 CT abd for stone- BL nephrolithiasis, some areas of fold thickening throughout colon- r/o neoplasia, mild wall thickening vs. Incomplete distention distal colon and rectum    Social ETOH, denies smoking or marijuana  Denies fxh GI cancer or IBD  Abdominal Surgeries: inguinal hernia repair    Last colonoscopy 3/2022 [facility] for screening: enlarged prostate, internal hemorrhoids.  Repeat 10 yrs  Denies EGD       Past Medical History  As per HPI.     Social History  he  reports that he has never smoked. He has never been exposed to tobacco smoke. He has never used smokeless tobacco. He reports that he does not currently use alcohol after a past usage of about 2.0 standard drinks of alcohol per week. He reports that he does not use drugs.     Family History  his family history includes Heart attack in his mother; cva in his father.     Review of Systems  Review of Systems   Constitutional:  Negative for appetite change, chills, fatigue and fever.   HENT:  Negative for trouble swallowing.    Respiratory:  Negative for cough and shortness of breath.    Gastrointestinal:  Negative for abdominal distention, abdominal pain, anal bleeding, blood in stool, constipation, diarrhea, nausea, rectal pain and vomiting.       Allergies  No Known Allergies    Medications  Current Outpatient Medications   Medication Instructions    aspirin 81 mg, oral, Daily    atorvastatin (Lipitor) 20 mg tablet 1 tablet,  oral, Nightly    chlorhexidine (Peridex) 0.12 % solution 15 ml swish and spit for 30 seconds night prior to surgery and morning of surgery    metoprolol succinate XL (TOPROL-XL) 25 mg, oral, Daily    sertraline (ZOLOFT) 25 mg, oral, Daily, Take with zoloft 50 mg to equal 75 mg daily    sertraline (ZOLOFT) 50 mg, oral, Daily    sod sulf-pot chloride-mag sulf (Sutab) 1.479-0.188- 0.225 gram tablet Take as directed.    tamsulosin (FLOMAX) 0.4 mg, oral, Daily    traZODone (DESYREL) 100 mg, oral, Nightly PRN        Objective   Visit Vitals  Pulse 60      Physical Exam  Constitutional:       Appearance: Normal appearance. He is normal weight.   HENT:      Mouth/Throat:      Mouth: Mucous membranes are dry.      Pharynx: Oropharynx is clear.   Cardiovascular:      Rate and Rhythm: Normal rate and regular rhythm.   Pulmonary:      Effort: Pulmonary effort is normal.      Breath sounds: Normal breath sounds. No wheezing or rhonchi.   Abdominal:      General: Abdomen is flat. Bowel sounds are normal. There is no distension.      Palpations: Abdomen is soft. There is no hepatomegaly.      Tenderness: There is no abdominal tenderness. There is no guarding or rebound. Negative signs include Malik's sign.      Hernia: No hernia is present.   Musculoskeletal:         General: Normal range of motion.   Skin:     General: Skin is warm and dry.   Neurological:      General: No focal deficit present.      Mental Status: He is alert and oriented to person, place, and time.   Psychiatric:         Mood and Affect: Mood normal.         Behavior: Behavior normal.           Lab Results   Component Value Date    WBC 5.8 05/01/2024    WBC 4.0 (L) 04/13/2024    WBC 4.4 03/18/2023    HGB 14.6 05/01/2024    HGB 14.8 04/13/2024    HGB 14.6 03/18/2023    HCT 42.8 05/01/2024    HCT 44.9 04/13/2024    HCT 43.5 03/18/2023     05/01/2024     04/13/2024     03/18/2023     Lab Results   Component Value Date     05/01/2024      04/13/2024     03/18/2023    K 4.3 05/01/2024    K 5.4 (H) 04/13/2024    K 4.6 03/18/2023     05/01/2024     04/13/2024     03/18/2023    CO2 29 05/01/2024    CO2 31 04/13/2024    CO2 31 03/18/2023    BUN 25 (H) 05/01/2024    BUN 23 04/13/2024    BUN 17 03/18/2023    CREATININE 1.06 05/01/2024    CREATININE 1.17 04/13/2024    CREATININE 1.01 03/18/2023    CALCIUM 9.3 05/01/2024    CALCIUM 9.9 04/13/2024    CALCIUM 9.4 03/18/2023    PROT 6.9 04/13/2024    PROT 6.7 03/18/2023    PROT 6.3 (L) 10/12/2021    BILITOT 0.8 04/13/2024    BILITOT 0.9 03/18/2023    BILITOT 1.0 10/12/2021    ALKPHOS 63 04/13/2024    ALKPHOS 62 03/18/2023    ALKPHOS 54 10/12/2021    ALT 24 04/13/2024    ALT 22 03/18/2023    ALT 11 10/12/2021    AST 26 04/13/2024    AST 22 03/18/2023    AST 17 10/12/2021    GLUCOSE 86 05/01/2024    GLUCOSE 93 04/13/2024    GLUCOSE 85 03/18/2023           Himanshu Cano is a 54 y.o. male who presents to GI clinic for abnormal CT scan of abdomen.    Abnormal CT scan, colon  R/o neoplasia of colon or IBD  - schedule colonoscopy    Follow up as needed         Teresa Grace, APRN-CNP

## 2024-07-23 NOTE — TELEPHONE ENCOUNTER
Pt inquired about having xray put in chart and he go to [facility] today and just have BB give results once back in? He asks if this is better than going to UC    Advise

## 2024-09-05 DIAGNOSIS — G47.00 INSOMNIA, UNSPECIFIED TYPE: ICD-10-CM

## 2024-09-05 RX ORDER — TRAZODONE HYDROCHLORIDE 100 MG/1
100 TABLET ORAL NIGHTLY PRN
Qty: 90 TABLET | Refills: 3 | Status: SHIPPED | OUTPATIENT
Start: 2024-09-05 | End: 2025-09-05

## 2024-09-06 DIAGNOSIS — E78.5 HYPERLIPIDEMIA, UNSPECIFIED HYPERLIPIDEMIA TYPE: Primary | ICD-10-CM

## 2024-09-09 RX ORDER — ATORVASTATIN CALCIUM 20 MG/1
20 TABLET, FILM COATED ORAL NIGHTLY
Qty: 90 TABLET | Refills: 3 | Status: SHIPPED | OUTPATIENT
Start: 2024-09-09

## 2024-09-17 ENCOUNTER — APPOINTMENT (OUTPATIENT)
Dept: UROLOGY | Facility: HOSPITAL | Age: 54
End: 2024-09-17
Payer: COMMERCIAL

## 2024-09-20 ENCOUNTER — APPOINTMENT (OUTPATIENT)
Dept: GASTROENTEROLOGY | Facility: EXTERNAL LOCATION | Age: 54
End: 2024-09-20
Payer: COMMERCIAL

## 2024-10-13 DIAGNOSIS — I48.91 ATRIAL FIBRILLATION, UNSPECIFIED TYPE (MULTI): ICD-10-CM

## 2024-10-14 RX ORDER — METOPROLOL SUCCINATE 25 MG/1
25 TABLET, EXTENDED RELEASE ORAL DAILY
Qty: 90 TABLET | Refills: 0 | Status: SHIPPED | OUTPATIENT
Start: 2024-10-14

## 2024-10-23 ENCOUNTER — APPOINTMENT (OUTPATIENT)
Dept: PRIMARY CARE | Facility: CLINIC | Age: 54
End: 2024-10-23
Payer: COMMERCIAL

## 2024-10-23 VITALS
OXYGEN SATURATION: 97 % | HEART RATE: 56 BPM | WEIGHT: 151 LBS | BODY MASS INDEX: 23.7 KG/M2 | TEMPERATURE: 98.2 F | DIASTOLIC BLOOD PRESSURE: 68 MMHG | HEIGHT: 67 IN | RESPIRATION RATE: 16 BRPM | SYSTOLIC BLOOD PRESSURE: 110 MMHG

## 2024-10-23 DIAGNOSIS — Z00.00 ANNUAL PHYSICAL EXAM: ICD-10-CM

## 2024-10-23 DIAGNOSIS — M54.2 NECK PAIN: Primary | ICD-10-CM

## 2024-10-23 PROCEDURE — 3008F BODY MASS INDEX DOCD: CPT | Performed by: FAMILY MEDICINE

## 2024-10-23 PROCEDURE — 1036F TOBACCO NON-USER: CPT | Performed by: FAMILY MEDICINE

## 2024-10-23 PROCEDURE — 99214 OFFICE O/P EST MOD 30 MIN: CPT | Performed by: FAMILY MEDICINE

## 2024-10-23 NOTE — PROGRESS NOTES
"Subjective   Himanshu Cano is a 54 y.o. male who presents for Follow-up.  HPI    Neck pain for 6mo. No UE numbness or tingling. Notes a lump.     Saw GI for colon thickening on CT but since GI wasn't too concerned     Seeing uro next mo for kid stones.   Current Outpatient Medications on File Prior to Visit   Medication Sig Dispense Refill    aspirin 81 mg EC tablet Take 1 tablet (81 mg) by mouth once daily.      atorvastatin (Lipitor) 20 mg tablet TAKE 1 TABLET BY MOUTH EVERYDAY AT BEDTIME 90 tablet 3    metoprolol succinate XL (Toprol-XL) 25 mg 24 hr tablet TAKE ONE TABLET BY MOUTH EVERY DAY 90 tablet 0    sertraline (Zoloft) 25 mg tablet Take 1 tablet (25 mg) by mouth once daily. Take with zoloft 50 mg to equal 75 mg daily 30 tablet 11    sertraline (Zoloft) 50 mg tablet TAKE ONE TABLET BY MOUTH ONCE DAILY 30 tablet 11    traZODone (Desyrel) 100 mg tablet Take 1 tablet (100 mg) by mouth as needed at bedtime for sleep. 90 tablet 3    chlorhexidine (Peridex) 0.12 % solution 15 ml swish and spit for 30 seconds night prior to surgery and morning of surgery (Patient not taking: Reported on 10/23/2024) 473 mL 0    sod sulf-pot chloride-mag sulf (Sutab) 1.479-0.188- 0.225 gram tablet Take as directed. (Patient not taking: Reported on 10/23/2024) 24 tablet 0     No current facility-administered medications on file prior to visit.                  Objective   /68   Pulse 56   Temp 36.8 °C (98.2 °F)   Resp 16   Ht 1.702 m (5' 7\")   Wt 68.5 kg (151 lb)   SpO2 97%   BMI 23.65 kg/m²    Physical Exam  General: NAD  HEENT:NCAT, PERRLA, nml OP  Neck: no cervical GHISLAINE  Heart: RRR no murmur, no edema   Lungs: CTA b/l, no wheeze or rhonchi   GI: abd soft, nontender, nondistended.   MSK: no c/c/e. nml gait   LOWER CERV PARASPINAL MUSCLE TENDERNESS, NML NECK ROM  NML UE SENS/STRENGTH  NML  STRENGTH   Skin: warm and dry  Psych: cooperative, appropriate affect  Neuro: speech clear. A&Ox3  Assessment/Plan   Problem " List Items Addressed This Visit    None  Visit Diagnoses       Neck pain    -  Primary  C/w MSK pain   Check xray   RF to PT   If no improvement ortho RF  Declines Rx   RTC 6 mo or prn     Relevant Orders    Referral to Physical Therapy    XR cervical spine 2-3 views

## 2024-10-29 ENCOUNTER — OFFICE VISIT (OUTPATIENT)
Dept: UROLOGY | Facility: HOSPITAL | Age: 54
End: 2024-10-29
Payer: COMMERCIAL

## 2024-10-29 DIAGNOSIS — N20.0 KIDNEY STONE: Primary | ICD-10-CM

## 2024-10-29 PROCEDURE — G2211 COMPLEX E/M VISIT ADD ON: HCPCS | Performed by: STUDENT IN AN ORGANIZED HEALTH CARE EDUCATION/TRAINING PROGRAM

## 2024-10-29 PROCEDURE — 99214 OFFICE O/P EST MOD 30 MIN: CPT | Performed by: STUDENT IN AN ORGANIZED HEALTH CARE EDUCATION/TRAINING PROGRAM

## 2024-11-01 DIAGNOSIS — F41.1 GAD (GENERALIZED ANXIETY DISORDER): ICD-10-CM

## 2024-11-03 RX ORDER — SERTRALINE HYDROCHLORIDE 25 MG/1
TABLET, FILM COATED ORAL
Qty: 90 TABLET | Refills: 3 | Status: SHIPPED | OUTPATIENT
Start: 2024-11-03

## 2024-11-05 DIAGNOSIS — E78.5 HYPERLIPIDEMIA, UNSPECIFIED HYPERLIPIDEMIA TYPE: ICD-10-CM

## 2024-11-06 RX ORDER — ATORVASTATIN CALCIUM 20 MG/1
20 TABLET, FILM COATED ORAL NIGHTLY
Qty: 90 TABLET | Refills: 0 | Status: SHIPPED | OUTPATIENT
Start: 2024-11-06

## 2024-11-25 ENCOUNTER — APPOINTMENT (OUTPATIENT)
Dept: DERMATOLOGY | Facility: CLINIC | Age: 54
End: 2024-11-25
Payer: COMMERCIAL

## 2024-12-09 ENCOUNTER — EVALUATION (OUTPATIENT)
Dept: PHYSICAL THERAPY | Facility: CLINIC | Age: 54
End: 2024-12-09
Payer: COMMERCIAL

## 2024-12-09 DIAGNOSIS — R29.3 POSTURE IMBALANCE: ICD-10-CM

## 2024-12-09 DIAGNOSIS — M43.6 NECK STIFFNESS: ICD-10-CM

## 2024-12-09 DIAGNOSIS — M54.2 NECK PAIN: Primary | ICD-10-CM

## 2024-12-09 PROCEDURE — 97110 THERAPEUTIC EXERCISES: CPT | Mod: GP

## 2024-12-09 PROCEDURE — 97161 PT EVAL LOW COMPLEX 20 MIN: CPT | Mod: GP

## 2024-12-09 NOTE — PROGRESS NOTES
Physical Therapy    Physical Therapy Evaluation and Treatment      Patient Name: Himanshu Cano  MRN: [id number]  Today's Date: 12/9/24    Time Entry:   Time Calculation  Start Time: 0720  Stop Time: 0804  Time Calculation (min): 44 min  PT Evaluation Time Entry  PT Evaluation (Low) Time Entry: 36  PT Therapeutic Procedures Time Entry  Therapeutic Exercise Time Entry: 8    Assessment:  Patient  is a 54 yr old male presenting to PT Clinic due to intermittent but worsening central neck pain , which intermittently spreads to R UT and upper arm. Clinical examination reveals  the following impairments: faulty postural alignment including minimal forward head, thoracic scoliosis and abnormal  R scapular position, painful and restricted cervical AROM, hypomobility upper thoracic region,  and weakness in scapular stabilizers, and decreased patient education of condition.  These impairments result in the following functional limitations: limited participation in pain-free ADLs, pain with prolonged sitting, poor sleep quality, and inability to perform at their prior level of function.    The patient will benefit from skilled PT services to address above listed impairments/deficits in order to decrease pain during static positions and dynamic activities, safely return to unrestricted PLOF and decrease risk of recurrence.         Plan:  OP PT Plan  Treatment/Interventions: Cryotherapy, Dry needling, Education/ Instruction, Manual therapy, Taping techniques, Therapeutic activities, Therapeutic exercises  PT Plan: Skilled PT  PT Frequency: 2 times per week  Duration: 4-6 wks  Number of Treatments Authorized: auth required  Rehab Potential: Excellent  Plan of Care Agreement: Patient    Current Problem:   1. Neck pain  Follow Up In Physical Therapy    Referral to Physical Therapy      2. Posture imbalance        3. Neck stiffness            Subjective    Chief Complaint: Patient presents to clinic due to intermittent  pain  central lower cervical region, with occas spread up or down the spine.  Also, occas right UT pain which radiates into upper arm. Denies numbness or tingling in UE 's.  No previous history of neck pain.  Onset Date: 6 months  NELLY: denies trauma or injury, gradual onset    Current Condition:   Worse    Pain:  Location: central lower cervical spine, intermittent spread to R UT and upper arm  Description: ache  Aggravating Factors:  Poor posture, shoulder shrugs in the gym, sleeping on stomach then hurts in the AM  Relieving Factors:  standing straight or lying down on his back    Functional limitations:  No functional limitations, but has pain with ADL's and functional tasks.    Patient goals:  Eliminate the pain, learn what exercises to do to improve posture and strengthen weak muscles.     Relevant Information (PMH & Previous Tests/Imaging):   XR cervical spine ordered but not yet completed  Previous Interventions/Treatments:   None    Prior Level of Function (PLOF)  Patient previously independent with all ADLs  Exercise/Physical Activity: exercises at gym 6 days/wk   Work/School: , manager.  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    Patients Living Environment: Reviewed and no concern  Lives with wife.    Primary Language: English    Red Flags: Do you have any of the following? No  Fever/chills, unexplained weight changes, dizziness/fainting, unexplained change in bowel or bladder functions, unexplained malaise or muscle weakness, night pain/sweats, numbness or tingling.     General:  General  Reason for Referral: Neck pain M54.2  Referred By: Brittany Behm,   Past Medical History Relevant to Rehab: back pain, B patellofemoral knee pain  General Comment: Visit 1;  Insurance: 12/06/2024: AUTH REQUIRED,  3500 DED-NOT MET, 90/10 COVERAGE, 30V PT/OT, 5100 OOP-NOT MET, [facility] EMP       Precautions:  Precautions  Precautions Comment: none reported or noted in chart     Objective   Posture  min  "FH, left shoulder girdle elevated, thoracic scoliosis convexity to L,  pelvis level,  right scapula depressed and winged.   Right ribs prominent/trunk rotated right.    Minimal right rib hump with forward flexion for spine.    Cervical AROM (Degrees/Pain)  (ERP denotes End Range Pain; PDM denotes Pain During Movement)  Flexion 48 deg,  end range stretch  Extension 60  deg,  end range pain in central neck lower cervical/upper thoracic  Retraction min restriction, no pain  (L) Side Bend: 35 deg, stiff during motion  (R) Side Bend: 40 deg, no pain  (L) Rotation: 65 deg,   no pain  (R) Rotation: 60 deg, end range pain along right spine    Cervical repeated movements  Repeated Retraction x 10 reps:  increased muscle soreness  Repeated Flexion x 10 reps: no change  Repeated Extension x 10 reps: increased soreness right neck   Repeated Rotation R x 10 reps:  right shoulder blade pain throughout motion, no worse afterwards  Repeated Rotation L x 10 reps:  no change    Joint mobility  Hypomobility T1-3 P-A glides    Shoulder AROM (Degrees/Pain)  B shoulders WNL and painfree   Minimally impaired R SH rhythm due to scapular weakness  I  Strength (MMT/Pain)  Shoulder-  Left UE 5/5 and painfree  Right UE 5/5 and painfree except   -Abduction R  5/5 min pain  -Supraspinatus R 4-/5 mod pain,     Right Elbow flexion/extension 5/5 and painfree  Right Wrist extension R  5/5 and painfree  Right Wrist flexion R   5/5 and painfree    Periscapular Muscles Strength (MMT)  Mid Traps    R 4+/5   L 4+/5  Low Traps   R 3+/5 min painful ,   L 4-/5  Rhomboids   R 4/5   L 5/5  Lats   R 4+/5   L 5/5  Serratus Anterior  - winged scapulae R>L    Flexibility  Pect Minor B good  Pect Major B good          Outcome Measures:  Other Measures  Neck Disability Index: score 8,  16% disability     Treatment:  Instructed in and performed the following for HEP:   Postural correction  Seated cervical retraction 5\" hold x 10 reps  Supine cervical retraction with " "self overpressure 5\" hold x 10 reps  Standing wall push ups with a plus 1 x 10 reps    Written instructions provided for HEP. Educated patient in correct movement pattern for cervical flexion as to minimize neck pain and strain.       EDUCATION:   Outpatient Education  Individual(s) Educated: Patient  Education Provided: Home Exercise Program, POC, Posture  Risk and Benefits Discussed with Patient/Caregiver/Other: yes  Patient/Caregiver Demonstrated Understanding: yes  Plan of Care Discussed and Agreed Upon: yes      Access Code: [email or web address]  URL: [email or web address]  Date: 12/09/2024  Prepared by: Kinza Boothe    Program Notes  stop any exercise that causes lasting increase in pain.remember axis of rotation through ear    Exercises  - Supine Chin Tuck  - 3 x daily - 7 x weekly - 2 sets - 10 reps  - Seated Passive Cervical Retraction  - 5 x daily - 7 x weekly - 2 sets - 10 reps  - Wall Push Up with Plus  - 1 x daily - 7 x weekly - 2 sets - 15 reps      Goals:  Active       PT Problem       Pain       Start:  12/09/24    Expected End:  02/05/25       Patient will report reduction of pain by  90%  to ease performance of all ADLs, leisure activities and work/household tasks in order to return to PLOF.         ROM       Start:  12/09/24    Expected End:  02/05/25       Patient will increase Cervical AROM to WNL without pain  to ease performance of ADLs, leisure activities and work tasks , including turning head R/L while driving.           Strength       Start:  12/09/24    Expected End:  02/05/25       Patient will increase bilateral scapular strength to 4/5  in order to normalize shoulder biomechanics and decrease pain  in neck to return patient to pain-free functional, work and leisure activities.             HEP       Start:  12/09/24    Expected End:  02/05/25       Patient will be independent and compliant with safe and recommended  HEP to enhance functional progress and long term management " of condition.           Outcome       Start:  12/09/24    Expected End:  02/05/25       Patient will improve outcome measures score on Neck Disability Index by 4 pts to show a clinically significant improvement  in functional mobility.          Joint Mobility       Start:  12/09/24    Expected End:  02/05/25       Patient will demonstrate increased joint mobility of upper thoracic region to normalize stress to cervical region.           Date of Evaluation: 12/9/24  Onset Date: 10/23/24  Referring Physician: Brittany Behm, DO Lisa A Roessler, PT  CPT Codes: Therapeutic Exercise: 19060, Therapeutic Activity: 57673, and Manual Therapy: 95970  Diagnosis:   Problem List Items Addressed This Visit             ICD-10-CM    Posture imbalance R29.3    Neck stiffness M43.6     Other Visit Diagnoses         Codes    Neck pain    -  Primary M54.2    Relevant Orders    Follow Up In Physical Therapy          Outcome: NDI score 8, 16% disability  Autism: No  PT Evaluation Complexity: Low  28696,   Which of the following best describes the primary reason of therapy: Improving, restoring, or adapting functional mobility or skills  Surgery within last 3 months: No  Select all conditions that apply: Unknown  Visits Requested: 12    Social Determinants of health:   Money    No  physical home environment   No  no job/ work conditions         No  education/literacy                   No  Childhood experiences           No  social supports/coping skills  No  unhealthy behaviors                   No  Limited access to healthcare               No

## 2024-12-11 PROBLEM — M43.6 NECK STIFFNESS: Status: ACTIVE | Noted: 2024-12-11

## 2024-12-11 PROBLEM — R29.3 POSTURE IMBALANCE: Status: ACTIVE | Noted: 2024-12-11

## 2024-12-14 ENCOUNTER — HOSPITAL ENCOUNTER (OUTPATIENT)
Dept: RADIOLOGY | Facility: CLINIC | Age: 54
Discharge: HOME | End: 2024-12-14
Payer: COMMERCIAL

## 2024-12-14 DIAGNOSIS — M54.2 NECK PAIN: ICD-10-CM

## 2024-12-14 PROCEDURE — 72050 X-RAY EXAM NECK SPINE 4/5VWS: CPT

## 2024-12-14 PROCEDURE — 72050 X-RAY EXAM NECK SPINE 4/5VWS: CPT | Performed by: RADIOLOGY

## 2024-12-16 ENCOUNTER — TREATMENT (OUTPATIENT)
Dept: PHYSICAL THERAPY | Facility: CLINIC | Age: 54
End: 2024-12-16
Payer: COMMERCIAL

## 2024-12-16 DIAGNOSIS — M54.2 NECK PAIN: Primary | ICD-10-CM

## 2024-12-16 DIAGNOSIS — M43.6 NECK STIFFNESS: ICD-10-CM

## 2024-12-16 DIAGNOSIS — R29.3 POSTURE IMBALANCE: ICD-10-CM

## 2024-12-16 PROCEDURE — 97110 THERAPEUTIC EXERCISES: CPT | Mod: GP

## 2024-12-16 NOTE — PROGRESS NOTES
Physical Therapy                                                                                  Physical Therapy Treatment       Patient name: Himanshu Cano  MRN:   [id number]  Today's Date: 12/16/2024  2      Time Calculation  Start Time: 0715  Stop Time: 0758  Time Calculation (min): 43 min    Assessment:  Patient completed session today with good effort and demonstrated good exercise technique with all exercises.  Patient reported pain decreased to 0/10 at the end of session, and stated he felt looser. Improved cervical alignment noted this date.  Patient will continue to benefit from skilled PT services to address deficits/impairments contributing to limited functional abilities and pain.       Plan:    Monitor response to treatment and adjust plan as needed.   To continue with current POC with emphasis on cervical and thoracic ROM, postural alignment and scapular strengthening.  Consider instruction in use of  scoliosis wedges in supine to improve spinal alignment in lying.      Current Problem:   1. Neck pain  Follow Up In Physical Therapy      2. Posture imbalance        3. Neck stiffness                General:  General  Reason for Referral: Neck pain M54.2  Referred By: Brittany Behm, DO  Past Medical History Relevant to Rehab: back pain, B patellofemoral knee pain  General Comment: Visit 2 of 30  Insurance: 12/06/2024: AUTH REQUIRED,  3500 DED-NOT MET, 90/10 COVERAGE, 30V PT/OT, 5100 OOP-NOT MET, [facility] EMP         Precautions:  Precautions  Precautions Comment: none reported or noted in chart       Subjective:  Patient reported 3/10 pain in center of upper back this AM.  States he was a little more sore this week possibly due to playing whirlyball.      Response to last session:  no problems reported   Performing HEP: yes, no questions or problems      Pain  3/10 at the start of session      Treatment:   Therapeutic exercises  - to decrease pain, increase ROM and improve strength and  "posture    Recumbent scifit, seat 11, level 2.5 x 8 mins for active warm up with BUE and BLE's    Seated  Cervical retraction  with self overpressure 5\" hold  2 x 10 reps  Thoracic extension with small bolster horizontally behind thoracic spine 3-5\" hold 2 x 10 reps    Standing  Overhead SB rolls at the wall 3-5\" hold 2 x 10 reps  B wall push ups with plus 2 x 10 reps  B \"Y\" lifts off the wall 2 x 10 reps    Seated  Cervical retraction extension 2 x 10 reps     Hooklying  B shoulder horizontal ABD with blue theraband, with palms up and palms down  1 x 15 reps   R/L shoulder UE D2 flexion with blue theraband 2 x 15 reps    Supine   Cervical retraction with self overpressure 5\" hold x 10 reps            Education:  Added cervical and thoracic extension to  HEP, see below.  Access Code: [email or web address]  URL: [email or web address]  Date: 12/16/2024  Prepared by: Kinza Boothe    Program Notes  stop any exercise that causes lasting increase in pain.remember axis of rotation through ear    Exercises  - Supine Chin Tuck  - 3 x daily - 7 x weekly - 2 sets - 10 reps  - Seated Passive Cervical Retraction  - 5 x daily - 7 x weekly - 2 sets - 10 reps  - Wall Push Up with Plus  - 1 x daily - 7 x weekly - 2 sets - 15 reps  - Seated Thoracic Lumbar Extension  - 3 x daily - 7 x weekly - 2 sets - 10 reps - 5\" hold  - Seated Cervical Retraction and Extension  - 3 x daily - 7 x weekly - 2 sets - 10 reps - 5 hold        Kinza Boothe, PT    Active       PT Problem       Pain       Start:  12/09/24    Expected End:  02/05/25       Patient will report reduction of pain by  90%  to ease performance of all ADLs, leisure activities and work/household tasks in order to return to PLOF.         ROM       Start:  12/09/24    Expected End:  02/05/25       Patient will increase Cervical AROM to WNL without pain  to ease performance of ADLs, leisure activities and work tasks , including turning head R/L while driving.           " Strength       Start:  12/09/24    Expected End:  02/05/25       Patient will increase bilateral scapular strength to 4/5  in order to normalize shoulder biomechanics and decrease pain  in neck to return patient to pain-free functional, work and leisure activities.             HEP       Start:  12/09/24    Expected End:  02/05/25       Patient will be independent and compliant with safe and recommended  HEP to enhance functional progress and long term management of condition.           Outcome       Start:  12/09/24    Expected End:  02/05/25       Patient will improve outcome measures score on Neck Disability Index by 4 pts to show a clinically significant improvement  in functional mobility.          Joint Mobility       Start:  12/09/24    Expected End:  02/05/25       Patient will demonstrate increased joint mobility of upper thoracic region to normalize stress to cervical region.

## 2024-12-23 ENCOUNTER — TREATMENT (OUTPATIENT)
Dept: PHYSICAL THERAPY | Facility: CLINIC | Age: 54
End: 2024-12-23
Payer: COMMERCIAL

## 2024-12-23 DIAGNOSIS — M43.6 NECK STIFFNESS: ICD-10-CM

## 2024-12-23 DIAGNOSIS — R29.3 POSTURE IMBALANCE: ICD-10-CM

## 2024-12-23 DIAGNOSIS — M54.2 NECK PAIN: Primary | ICD-10-CM

## 2024-12-23 PROCEDURE — 97110 THERAPEUTIC EXERCISES: CPT | Mod: GP

## 2024-12-23 NOTE — PROGRESS NOTES
Physical Therapy                                                                                  Physical Therapy Treatment       Patient name: Himanshu Cano  MRN:   [id number]  Today's Date: 12/23/2024  3      Time Calculation  Start Time: 0717  Stop Time: 0800  Time Calculation (min): 43 min    Assessment:  Patient completed session today with good effort and demonstrated good exercise technique with all exercises.  Patient is progressing well with present program.  Patient tolerated treatment well without increased complaints of pain during or after session.   Patient will continue to benefit from skilled PT services to address deficits/impairments contributing to limited functional abilities and pain.        Plan:    Assess leg length next visit.  Assess BLE ROM.  Monitor response to treatment and adjust plan as needed.   To continue with current POC with emphasis on cervical and thoracic ROM, postural alignment and scapular strengthening.  Consider instruction in use of  scoliosis wedges in supine to improve spinal alignment in lying.          Current Problem:   1. Neck pain  Follow Up In Physical Therapy       2. Posture imbalance          3. Neck stiffness       General:  General  Reason for Referral: Neck pain M54.2  Referred By: Brittany Behm,   Past Medical History Relevant to Rehab: back pain, B patellofemoral knee pain  General Comment: Visit 3 of 30  Insurance: 12/06/2024: AUTH REQUIRED,  3500 DED-NOT MET, 90/10 COVERAGE, 30V PT/OT, 5100 OOP-NOT MET, [facility] EMP      Precautions:  Precautions  Precautions Comment: none reported or noted in chart  Subjective:  Patient reported his neck has been bothering him the last couple days.  States he is unsure why.      Response to last session:  No problems   Performing HEP: yes       Pain  2/10 at the start of session  0/10 at the end of session      Treatment:    Therapeutic exercises  - to decrease pain, increase ROM and improve strength and posture    "  Recumbent scifit, seat 11, level 2.5 x 10 mins, hills - multipeaks for active warm up with BUE and BLE's     Seated  Cervical retraction  with self overpressure 5\" hold  1 x 20  reps  Thoracic extension in straight back armchair  3-5\" hold 1 x 20 reps    Supine without pillow  Cervical retraction with self overpressure 5\" hold x 10 reps  Upper thoracic stretch using foam roller x 1 mins  Cervical retraction  off edge of mat with clinician overpressure 3\" hold 1 x 10, with self assist and overpressure 3\" hold 1 x 10  B pectoral stretch 3 x 20\"  R/L shoulder UE D2 flexion with blue theraband 2 x 15 reps    Prone on elbows  Cervical retraction 3\" hold 1 x 20 reps    Quadruped  Alt R/L UE lifts 1 x 20 reps  Bird dog 2 x 10 reps    Standing  Overhead SB rolls at the wall 3-5\" hold 1 x 20 reps  B wall push ups with plus 1 x 20 reps  B \"Y\" lifts off the wall 1 x 20 reps       Education:  Continue with current HEP as previously instructed.  Access Code: [email or web address]  URL: [email or web address]  Date: 12/16/2024  Prepared by: Kinza Boothe     Program Notes  stop any exercise that causes lasting increase in pain.remember axis of rotation through ear     Exercises  - Supine Chin Tuck  - 3 x daily - 7 x weekly - 2 sets - 10 reps  - Seated Passive Cervical Retraction  - 5 x daily - 7 x weekly - 2 sets - 10 reps  - Wall Push Up with Plus  - 1 x daily - 7 x weekly - 2 sets - 15 reps  - Seated Thoracic Lumbar Extension  - 3 x daily - 7 x weekly - 2 sets - 10 reps - 5\" hold  - Seated Cervical Retraction and Extension  - 3 x daily - 7 x weekly - 2 sets - 10 reps - 5 hold       Goals  Active       PT Problem       Pain       Start:  12/09/24    Expected End:  02/05/25       Patient will report reduction of pain by  90%  to ease performance of all ADLs, leisure activities and work/household tasks in order to return to PLOF.         ROM       Start:  12/09/24    Expected End:  02/05/25       Patient will increase " Cervical AROM to WNL without pain  to ease performance of ADLs, leisure activities and work tasks , including turning head R/L while driving.           Strength       Start:  12/09/24    Expected End:  02/05/25       Patient will increase bilateral scapular strength to 4/5  in order to normalize shoulder biomechanics and decrease pain  in neck to return patient to pain-free functional, work and leisure activities.             HEP       Start:  12/09/24    Expected End:  02/05/25       Patient will be independent and compliant with safe and recommended  HEP to enhance functional progress and long term management of condition.           Outcome       Start:  12/09/24    Expected End:  02/05/25       Patient will improve outcome measures score on Neck Disability Index by 4 pts to show a clinically significant improvement  in functional mobility.          Joint Mobility       Start:  12/09/24    Expected End:  02/05/25       Patient will demonstrate increased joint mobility of upper thoracic region to normalize stress to cervical region.

## 2025-01-07 ENCOUNTER — TREATMENT (OUTPATIENT)
Dept: PHYSICAL THERAPY | Facility: CLINIC | Age: 55
End: 2025-01-07
Payer: COMMERCIAL

## 2025-01-07 DIAGNOSIS — M54.2 NECK PAIN: Primary | ICD-10-CM

## 2025-01-07 PROCEDURE — 97140 MANUAL THERAPY 1/> REGIONS: CPT | Mod: GP

## 2025-01-07 PROCEDURE — 97110 THERAPEUTIC EXERCISES: CPT | Mod: GP

## 2025-01-07 NOTE — PROGRESS NOTES
Physical Therapy                                                                                  Physical Therapy Treatment       Patient name: Himanshu Cano  MRN:   [id number]  Today's Date: 1/7/2025  4      Time Calculation  Start Time: 1646  Stop Time: 1730  Time Calculation (min): 44 min    Assessment:  CPA Joint restrictions noted in lower cervical and UT regions contributing to neck stiffness and pain.   Manual therapy was performed to address restrictions.  Patient tolerated manual therapy well without complaints.  Patient completed session today with very good effort.  Addressed sleeping position to increase neck comfort at night.     Patient will continue to benefit from skilled PT services to address deficits/impairments contributing to limited functional abilities and pain.      Plan:    Monitor response to treatment and adjust plan as needed.     To continue with current POC with emphasis on cervical and thoracic ROM and joint mobility, postural alignment and scapular strengthening.     Current Problem:   1. Neck pain  Follow Up In Physical Therapy       2. Posture imbalance          3. Neck stiffness         General:  General  Reason for Referral: Neck pain M54.2  Referred By: Brittany Behm, DO  Past Medical History Relevant to Rehab: back pain, B patellofemoral knee pain  General Comment: Visit 4 of 30  Insurance: 12/06/2024: AUTH REQUIRED,  3500 DED-NOT MET, 90/10 COVERAGE, 30V PT/OT, 5100 OOP-NOT MET, [facility] E[facility]      Precautions:  Precautions  Precautions Comment: none reported or noted in chart     Subjective:  Patient reported neck pain is still intermittent.  Pain rated best 0/10 and worst 3/10.  Pain rated 3/10 at present in R upper thoracic region.   Response to last session:   No problems.  Performing HEP: doing HEP daily.      Pain  3/10 at the start of session.  Minimal increase in soreness at central UT region.     Objective  Leg length assessed and right LE long in standing, supine and  "prone.  Provided patient with heel lift for L shoe .      ROM  B hips WFL and painfree    Treatment:   Therapeutic exercises  - to decrease pain, increase ROM and improve strength and posture      Seated  Thoracic/cervical  extension in straight back armchair  3-5\" hold 2 x 10 reps   Cervical extension with end range rotation R/L 2 x 10 reps     Standing  Overhead SB rolls at the wall 3-5\" hold 1 x 20 reps  B wall push ups with plus 1 x 20 reps  B \"Y\" lifts off the wall 1 x 20 reps     Manual Therapy  Seated  Cervical retraction with clinician overpressure and mobilizations to UT vertebrae.  STM to B UT and LS region, R>L.  Supine assisted cervical and UT extension off edge of mat.      Deferred the following this date 1/7/25:  Quadruped  Alt R/L UE lifts 1 x 20 reps  Bird dog 2 x 10 reps    Education:  Educated patient in use of rolled towel in pillowcase for neck support in sleeping.  Issued heel lift for left shoe and educated patient in gradual increase in wear schedule as tolerated.  Also advised patient to remove one wedge from adjustable heel lift if feels it is too much of a correction.  Total height of heel lift 1/2\".   Encouraged patient to perform seated thoracic extension in sitting and supine cervical/UT extension off edge of bed with assist from hands to increase lower cervical and UT extension.     Access Code: [email or web address]  URL: [email or web address]  Date: 12/16/2024  Prepared by: Kinza Boothe     Program Notes  stop any exercise that causes lasting increase in pain.remember axis of rotation through ear     Exercises  - Supine Chin Tuck  - 3 x daily - 7 x weekly - 2 sets - 10 reps  - Seated Passive Cervical Retraction  - 5 x daily - 7 x weekly - 2 sets - 10 reps  - Wall Push Up with Plus  - 1 x daily - 7 x weekly - 2 sets - 15 reps  - Seated Thoracic Lumbar Extension  - 3 x daily - 7 x weekly - 2 sets - 10 reps - 5\" hold  - Seated Cervical Retraction and Extension  - 3 x daily - " 7 x weekly - 2 sets - 10 reps - 5 hold      Goals  Active       PT Problem       Pain       Start:  12/09/24    Expected End:  02/05/25       Patient will report reduction of pain by  90%  to ease performance of all ADLs, leisure activities and work/household tasks in order to return to PLOF.         ROM       Start:  12/09/24    Expected End:  02/05/25       Patient will increase Cervical AROM to WNL without pain  to ease performance of ADLs, leisure activities and work tasks , including turning head R/L while driving.           Strength       Start:  12/09/24    Expected End:  02/05/25       Patient will increase bilateral scapular strength to 4/5  in order to normalize shoulder biomechanics and decrease pain  in neck to return patient to pain-free functional, work and leisure activities.             HEP       Start:  12/09/24    Expected End:  02/05/25       Patient will be independent and compliant with safe and recommended  HEP to enhance functional progress and long term management of condition.           Outcome       Start:  12/09/24    Expected End:  02/05/25       Patient will improve outcome measures score on Neck Disability Index by 4 pts to show a clinically significant improvement  in functional mobility.          Joint Mobility       Start:  12/09/24    Expected End:  02/05/25       Patient will demonstrate increased joint mobility of upper thoracic region to normalize stress to cervical region.

## 2025-01-12 DIAGNOSIS — I48.91 ATRIAL FIBRILLATION, UNSPECIFIED TYPE (MULTI): ICD-10-CM

## 2025-01-13 RX ORDER — METOPROLOL SUCCINATE 25 MG/1
25 TABLET, EXTENDED RELEASE ORAL DAILY
Qty: 90 TABLET | Refills: 0 | Status: SHIPPED | OUTPATIENT
Start: 2025-01-13

## 2025-01-14 ENCOUNTER — TREATMENT (OUTPATIENT)
Dept: PHYSICAL THERAPY | Facility: CLINIC | Age: 55
End: 2025-01-14
Payer: COMMERCIAL

## 2025-01-14 DIAGNOSIS — M54.2 NECK PAIN: ICD-10-CM

## 2025-01-14 PROCEDURE — 97110 THERAPEUTIC EXERCISES: CPT | Mod: GP

## 2025-01-14 PROCEDURE — 97140 MANUAL THERAPY 1/> REGIONS: CPT | Mod: GP

## 2025-01-14 NOTE — PROGRESS NOTES
Physical Therapy                                                                                  Physical Therapy Treatment       Patient name: Himanshu Cano  MRN:   [id number]  Today's Date: 1/14/2025  5      Time Calculation  Start Time: 1734  Stop Time: 1815  Time Calculation (min): 41 min    Assessment:  Patient completed session today with very good effort and demonstrated good form with all exercises and without complaints of increased pain.  Also noted increased ROM with cervical and thoracic extension.  Patient reported decreased neck pain following manual therapy.  Patient may benefit from mechanical cervical traction.   Patient will continue to benefit from skilled PT services to address deficits/impairments contributing to limited functional abilities and pain.        Plan:    Re-check next visit.  Possible mechanical cervical traction next visit if reports benefit from manual traction.   Monitor response to treatment and adjust plan as needed.     To continue with current POC with emphasis on cervical and thoracic ROM and joint mobility, postural alignment and scapular strengthening.        Current Problem:   1. Neck pain  Follow Up In Physical Therapy       2. Posture imbalance          3. Neck stiffness       General:  General  Reason for Referral: Neck pain M54.2  Referred By: Brittany Behm, DO  Past Medical History Relevant to Rehab: back pain, B patellofemoral knee pain  General Comment: Visit 5 of 30  Insurance: 12/06/2024: AUTH REQUIRED,  3500 DED-NOT MET, 90/10 COVERAGE, 30V PT/OT, 5100 OOP-NOT MET, [facility] E[facility]      Precautions:  Precautions  Precautions Comment: none reported or noted in chart      Subjective:  Patient reported some increase in neck pain since last visit. States heel lift feels good.  Tried rolled towel in pillowcase but not beneficial.   Response to last session:  increased pain in neck   Performing HEP: yes     Pain  4/10 central neck pain at the start of session  Reports neck  "pain feels much better at the end of session but no number given.     Treatment:    Therapeutic exercises  Recumbent scifit, seat 10, level 1.7, x 8 mins    Seated  Cervical retraction with self overpressure 3\" hold 2 x 10  Cervical extension 1 x 10  Cervical extension with rotation R/L to end range 2 x 10   Thoracic extension with cervical extension 1 x 20     Standing  Overhead SB rolls at the wall 3-5\" hold 1 x 20 reps  B wall push ups with plus  on SB 1 x 20 reps  B \"Y\" lifts off the wall 1 x 20 reps  B \"reji\" wings at wall 1 x 10 reps    Manual Therapy  Suboccipital inhibition  Manual cervical distraction 5 x 60\"  Passive stretch to R/L UT and LS 2 x 45\" each  Ended with manual cervical distraction 1 x 60\"        Education:  Continue with current HEP as instructed.  Access Code: [email or web address]  URL: [email or web address]  Date: 12/16/2024  Prepared by: Kinza Boothe     Program Notes  stop any exercise that causes lasting increase in pain.remember axis of rotation through ear     Exercises  - Supine Chin Tuck  - 3 x daily - 7 x weekly - 2 sets - 10 reps  - Seated Passive Cervical Retraction  - 5 x daily - 7 x weekly - 2 sets - 10 reps  - Wall Push Up with Plus  - 1 x daily - 7 x weekly - 2 sets - 15 reps  - Seated Thoracic Lumbar Extension  - 3 x daily - 7 x weekly - 2 sets - 10 reps - 5\" hold  - Seated Cervical Retraction and Extension  - 3 x daily - 7 x weekly - 2 sets - 10 reps - 5 hold     Goals  Active       PT Problem       Pain       Start:  12/09/24    Expected End:  02/05/25       Patient will report reduction of pain by  90%  to ease performance of all ADLs, leisure activities and work/household tasks in order to return to PLOF.         ROM       Start:  12/09/24    Expected End:  02/05/25       Patient will increase Cervical AROM to WNL without pain  to ease performance of ADLs, leisure activities and work tasks , including turning head R/L while driving.           Strength       " Start:  12/09/24    Expected End:  02/05/25       Patient will increase bilateral scapular strength to 4/5  in order to normalize shoulder biomechanics and decrease pain  in neck to return patient to pain-free functional, work and leisure activities.             HEP       Start:  12/09/24    Expected End:  02/05/25       Patient will be independent and compliant with safe and recommended  HEP to enhance functional progress and long term management of condition.           Outcome       Start:  12/09/24    Expected End:  02/05/25       Patient will improve outcome measures score on Neck Disability Index by 4 pts to show a clinically significant improvement  in functional mobility.          Joint Mobility       Start:  12/09/24    Expected End:  02/05/25       Patient will demonstrate increased joint mobility of upper thoracic region to normalize stress to cervical region.

## 2025-01-21 ENCOUNTER — TREATMENT (OUTPATIENT)
Dept: PHYSICAL THERAPY | Facility: CLINIC | Age: 55
End: 2025-01-21
Payer: COMMERCIAL

## 2025-01-21 DIAGNOSIS — M43.6 NECK STIFFNESS: ICD-10-CM

## 2025-01-21 DIAGNOSIS — M54.2 NECK PAIN: Primary | ICD-10-CM

## 2025-01-21 DIAGNOSIS — R29.3 POSTURE IMBALANCE: ICD-10-CM

## 2025-01-21 PROCEDURE — 97110 THERAPEUTIC EXERCISES: CPT | Mod: GP

## 2025-01-21 PROCEDURE — 97530 THERAPEUTIC ACTIVITIES: CPT | Mod: GP

## 2025-01-21 PROCEDURE — 97012 MECHANICAL TRACTION THERAPY: CPT | Mod: GP

## 2025-01-21 NOTE — PROGRESS NOTES
"Physical Therapy                                                                                  Physical Therapy Treatment /PT Recheck      Patient name: Himanshu Cano  MRN:   [id number]  Today's Date: 1/21/2025  6      Time Calculation  Start Time: 1735  Stop Time: 1822  Time Calculation (min): 47 min  PT Therapeutic Procedures Time Entry  Therapeutic Exercise Time Entry: 17  Therapeutic Activity Time Entry: 20     Assessment:  Patient has been seen for 6 visits for cervical and upper thoracic ROM, stretching and strengthening exercises.  He also received manual therapy to his neck, and today we added mechanical cervical traction which patient tolerated well without complaints, even reporting it felt \"relaxing\".  Patient reported 0/10 pain after traction.   Objectively, patient shows improvement in pain levels, postural alignment and awareness, cervical and upper thoracic ROM, strength and functional activity tolerance.  Plan to continue PT 1-2 more sessions to insure resolution of symptoms, finalize HEP to decrease likelihood of recurrence.     Plan:    Monitor response to treatment and adjust plan as needed.   To continue with current POC to resolve symptoms.       Current Problems  1. Neck pain  Follow Up In Physical Therapy      2. Posture imbalance        3. Neck stiffness               General:  General  Reason for Referral: Neck pain M54.2  Referred By: Brittany Behm,   Past Medical History Relevant to Rehab: back pain, B patellofemoral knee pain  General Comment: Visit 6 of 30  Insurance: 12/06/2024: AUTH REQUIRED,  3500 DED-NOT MET, 90/10 COVERAGE, 30V PT/OT, 5100 OOP-NOT MET, [facility] E[facility]      Precautions:  Precautions  Precautions Comment: none reported or noted in chart      Subjective:  Patient reported no pain at the start of session.   States he felt better following last session. Had a good week with significantly less pain. Pain worst at 1/10 since last visit.  Response to last session:  no " problems    Performing HEP: yes     Pain  0/10 at the start of session  Slight increase in right upper thoracic spinal pain during session following repeated movements cervical spine  0/10 pain at the end of session     Objective:  Posture  Improved postural awareness and correction.  Deviations persist only slightly.   Good alignment of head and neck , left shoulder girdle  slightly elevated,  slight thoracic scoliosis convexity to L,  improved right scapula position.   No trunk rotation noted.     Cervical AROM (Degrees/Pain)  (ERP denotes End Range Pain; PDM denotes Pain During Movement)  Flexion 52 deg,  no pain  Extension 78 deg,  no pain  Retraction no restriction, no pain  (L) Side Bend: 50 deg, stretch at end range  (R) Side Bend: 55 deg, min pain during motion in upper thoracic  (L) Rotation: 65 deg,   no pain  (R) Rotation: 65 deg, end range pain along right spine     Cervical repeated movements  Repeated Retraction x 10 reps:  no pain  Repeated Flexion x 10 reps: no pain  Repeated Extension x 10 reps: no pain  Repeated Rotation R x 10 reps:  pain along right thoracic spine, increased to dull pain afterwards  Repeated Rotation L x 10 reps:  resolved right spinal pain    Shoulder AROM (Degrees/Pain)  B shoulders WNL and painfree   Improved R SH rhythm     Strength (MMT/Pain)  Shoulder-  Left UE 5/5 and painfree  Right UE 5/5 and painfree except   -Abduction R  5/5 min pain  -Supraspinatus R - not tested    Right Elbow flexion/extension 5/5 and painfree  Right Wrist extension R  5/5 and painfree  Right Wrist flexion R   5/5 and painfree     Periscapular Muscles Strength (MMT)  Mid Traps    R 5/5   L 5/5  Low Traps   R 4+/5 min painful ,   L 4+/5  Rhomboids   R 5/5   L 5/5  Lats   R 5/5   L 5/5  Serratus Anterior  - right SA weak , increased winging and elevation of scapula as compared to left.    Flexibility  Pect Minor B good  Pect Major B good    Outcome Measures  Other Measures  Neck Disability Index:  "score 3, 6% disability (improved from 16%)      Treatment:   Therapeutic activity   Therapeutic Activities  Re-eval completed this date.  See Subjective and Goal status/comments for details.     Therapeutic exercises  Seated  Cervical retraction with self overpressure 3\" hold 2 x 10  Cervical extension 1 x 10  Cervical extension with rotation R/L to end range 2 x 10   Thoracic extension with cervical extension 1 x 20      Standing  Overhead SB rolls at the wall 3-5\" hold 1 x 10 reps  B wall push ups with plus  on SB 1 x 10 reps  B \"Y\" lifts off the wall 1 x 20 reps - deferred 1/21/25  B \"reji\" wings at wall 1 x 10 reps - deferred 1/21/25    Modalities  Mechanical cervical traction 12# on/5# off, 30 sec on/10 sec off x 10 mins in supine with bolster under knees.     Education:  Upgraded HEP to include scapular strengthening with blue theraband.  Written instructions and blue theraband provided.  Access Code: [email or web address]  URL: [email or web address]  Date: 01/21/2025  Prepared by: Kinza Boothe    Program Notes  stop any exercise that causes lasting increase in pain.remember axis of rotation through ear    Exercises  - Supine Chin Tuck  - 3 x daily - 7 x weekly - 2 sets - 10 reps  - Seated Passive Cervical Retraction  - 5 x daily - 7 x weekly - 2 sets - 10 reps  - Wall Push Up with Plus  - 1 x daily - 7 x weekly - 2 sets - 15 reps  - Seated Thoracic Lumbar Extension  - 3 x daily - 7 x weekly - 2 sets - 10 reps - 5\" hold  - Seated Cervical Retraction and Extension  - 3 x daily - 7 x weekly - 2 sets - 10 reps - 5 hold  - Supine Shoulder Horizontal Abduction with Resistance  - 1 x daily - 7 x weekly - 2 sets - 10 reps  - Supine Bilateral Shoulder External Rotation with Resistance around Wrists  - 1 x daily - 7 x weekly - 2 sets - 10 reps  - Supine PNF D2 Flexion with Resistance  - 1 x daily - 7 x weekly - 2 sets - 10 reps    Goals    Active       PT Problem       Pain (Progressing)       Start:  " 12/09/24    Expected End:  02/05/25       Patient will report reduction of pain by  90%  to ease performance of all ADLs, leisure activities and work/household tasks in order to return to PLOF.         ROM (Met)       Start:  12/09/24    Expected End:  02/05/25    Resolved:  01/21/25    Patient will increase Cervical AROM to WNL without pain  to ease performance of ADLs, leisure activities and work tasks , including turning head R/L while driving.           Strength (Met)       Start:  12/09/24    Expected End:  02/05/25    Resolved:  01/21/25    Patient will increase bilateral scapular strength to 4/5  in order to normalize shoulder biomechanics and decrease pain  in neck to return patient to pain-free functional, work and leisure activities.             HEP (Met)       Start:  12/09/24    Expected End:  02/05/25    Resolved:  01/21/25    Patient will be independent and compliant with safe and recommended  HEP to enhance functional progress and long term management of condition.           Outcome (Met)       Start:  12/09/24    Expected End:  02/05/25    Resolved:  01/21/25    Patient will improve outcome measures score on Neck Disability Index by 4 pts to show a clinically significant improvement  in functional mobility.          Joint Mobility (Progressing)       Start:  12/09/24    Expected End:  02/05/25       Patient will demonstrate increased joint mobility of upper thoracic region to normalize stress to cervical region.

## 2025-01-28 ENCOUNTER — TREATMENT (OUTPATIENT)
Dept: PHYSICAL THERAPY | Facility: CLINIC | Age: 55
End: 2025-01-28
Payer: COMMERCIAL

## 2025-01-28 DIAGNOSIS — M54.2 NECK PAIN: ICD-10-CM

## 2025-01-28 PROCEDURE — 97012 MECHANICAL TRACTION THERAPY: CPT | Mod: GP

## 2025-01-28 PROCEDURE — 97110 THERAPEUTIC EXERCISES: CPT | Mod: GP

## 2025-01-28 NOTE — PROGRESS NOTES
"Physical Therapy                                                                                  Physical Therapy Treatment       Patient name: Himanshu Cano  MRN:   [id number]  Today's Date: 1/28/2025  7      Time Calculation  Start Time: 1731  Stop Time: 1809  Time Calculation (min): 38 min    Assessment:  Patient completed session today with good  effort  and good exercise performance. Able to tolerate increased weight on cervical traction to 15# without complaints. Patient reports 0/10 pain at the end of session. Patient has made good progress in PT.  Plan one more visit for traction and to finalize HEP.      Plan  Plan to continue PT  per current POC for 1 more session to insure resolution of symptoms, finalize HEP to decrease likelihood of recurrence.           Current Problems  1. Neck pain  Follow Up In Physical Therapy       2. Posture imbalance          3. Neck stiffness                  General:  General  Reason for Referral: Neck pain M54.2  Referred By: Brittany Behm, DO  Past Medical History Relevant to Rehab: back pain, B patellofemoral knee pain  General Comment: Visit 7 of 30  Insurance: 12/06/2024: AUTH REQUIRED,  3500 DED-NOT MET, 90/10 COVERAGE, 30V PT/OT, 5100 OOP-NOT MET, [facility] E[facility]      Precautions:  Precautions  Precautions Comment: none reported or noted in chart     Subjective:  Patient reported no significant pain since last session, except slept wrong on Sunday and is having some minor right sided neck pain.     Response to last session:   no increase in pain following last session  Performing HEP: yes     Pain  1/10 right neck from sleeping wrong  reported at the start of session.   0/10 at the end of session.    Treatment:    Therapeutic exercises  Seated  Cervical retraction with self overpressure 3\" hold 2 x 10  Cervical extension with rotation R/L to end range 2 x 10   Thoracic extension with cervical extension 1 x 20      Standing  Overhead SB rolls at the wall 3-5\" hold 1 x 20 " "reps  B wall push ups with plus  on SB 1 x 20 reps  B \"Y\" lifts off the wall 1 x 20 reps      Modalities 10 mins  Mechanical cervical traction 15# on/5# off, 30 sec on/10 sec off x 10 mins in supine with bolster under knees.     Education:  Continue with current HEP as previously instructed.   Access Code: [email or web address]  URL: [email or web address]  Date: 01/21/2025  Prepared by: Kinza Boothe     Program Notes  stop any exercise that causes lasting increase in pain.remember axis of rotation through ear     Exercises  - Supine Chin Tuck  - 3 x daily - 7 x weekly - 2 sets - 10 reps  - Seated Passive Cervical Retraction  - 5 x daily - 7 x weekly - 2 sets - 10 reps  - Wall Push Up with Plus  - 1 x daily - 7 x weekly - 2 sets - 15 reps  - Seated Thoracic Lumbar Extension  - 3 x daily - 7 x weekly - 2 sets - 10 reps - 5\" hold  - Seated Cervical Retraction and Extension  - 3 x daily - 7 x weekly - 2 sets - 10 reps - 5 hold  - Supine Shoulder Horizontal Abduction with Resistance  - 1 x daily - 7 x weekly - 2 sets - 10 reps  - Supine Bilateral Shoulder External Rotation with Resistance around Wrists  - 1 x daily - 7 x weekly - 2 sets - 10 reps  - Supine PNF D2 Flexion with Resistance  - 1 x daily - 7 x weekly - 2 sets - 10 reps       Goals   Active       PT Problem       Pain (Progressing)       Start:  12/09/24    Expected End:  02/05/25       Patient will report reduction of pain by  90%  to ease performance of all ADLs, leisure activities and work/household tasks in order to return to PLOF.         ROM (Met)       Start:  12/09/24    Expected End:  02/05/25    Resolved:  01/21/25    Patient will increase Cervical AROM to WNL without pain  to ease performance of ADLs, leisure activities and work tasks , including turning head R/L while driving.           Strength (Met)       Start:  12/09/24    Expected End:  02/05/25    Resolved:  01/21/25    Patient will increase bilateral scapular strength to 4/5  in " order to normalize shoulder biomechanics and decrease pain  in neck to return patient to pain-free functional, work and leisure activities.             HEP (Met)       Start:  12/09/24    Expected End:  02/05/25    Resolved:  01/21/25    Patient will be independent and compliant with safe and recommended  HEP to enhance functional progress and long term management of condition.           Outcome (Met)       Start:  12/09/24    Expected End:  02/05/25    Resolved:  01/21/25    Patient will improve outcome measures score on Neck Disability Index by 4 pts to show a clinically significant improvement  in functional mobility.          Joint Mobility (Progressing)       Start:  12/09/24    Expected End:  02/05/25       Patient will demonstrate increased joint mobility of upper thoracic region to normalize stress to cervical region.

## 2025-01-30 NOTE — PROGRESS NOTES
Subjective   Patient ID: Himanshu Cano is a 54 y.o. male    HPI  54 y.o. male who presents for a follow-up visit with a history of kidney stones presenting for follow-up regarding bilateral renal calculi. He reports no current blockage or pain. The patient has a history of a kidney stone that was stuck during his last visit on 10/2021, and today's imaging shows stones similar in size to the previous ones. The left kidney has a 5mm stone, and the right kidney has a 4mm stone. The patient has been informed that these stones are not causing any obstruction.       Review of Systems    All systems were reviewed. Anything negative was noted in the HPI.    Objective   Physical Exam  Constitutional:       Appearance: Normal appearance.   HENT:      Head: Normocephalic and atraumatic.      Nose: Nose normal.   Pulmonary:      Effort: No respiratory distress.   Abdominal:      General: There is no distension.   Neurological:      Mental Status: He is alert.               Past Medical History:   Diagnosis Date    Abnormal Heart Score CT     2/19/16 was 367 -  f/u NST 2020 normal    BPH (benign prostatic hyperplasia)     MAURICIO (generalized anxiety disorder)     HLD (hyperlipidemia)     mild, on statin    Insomnia     Kidney stones     ONEIDA (obstructive sleep apnea)     mouthpiece no CPAP    Palpitations     managed on Metoprolol per cardiology note 7/21/23 with Sarah Beth Downing    Right bundle branch block          Past Surgical History:   Procedure Laterality Date    EXTRACORPOREAL SHOCK WAVE LITHOTRIPSY Left 05/28/2024    [facility]    HERNIA REPAIR Left     inguinal    LITHOTRIPSY  2018    MR HEAD ANGIO WO IV CONTRAST  08/22/2013    MR HEAD ANGIO WO IV CONTRAST [facility]    MR NECK ANGIO WO IV CONTRAST  08/22/2013    MR NECK ANGIO WO IV CONTRAST [facility]    ROTATOR CUFF REPAIR Right          Assessment/Plan   Bilateral nephrolithiasis, sp left ESWL 5/2024, BPH    54 y.o. male who presents for the above  condition, We had a very long and extensive discussion with the patient regarding the pathophysiology, differential diagnosis, risk factor, management, natural history, incidence and diagnostic work-up of the condition.    Bilateral nephrolithiasis, sp left ESWL 5/2024  We had a very long and extensive discussion with the patient regarding his condition.  I discussed with him the pathophysiology, differential diagnosis, risk factor, management of ureteral stones. I gave the patient 3 options of management including observation given the size of the location of the stone.  Explained that he has likely chance of spontaneous passage of the stone.  We also discussed ESWL which would be appropriate for the size of the location of stone.      Today, we had a very long and extensive discussion with the patient regarding the pathophysiology, differential diagnosis, risk factor, associated condition, diagnostic work-up and management of BPH and lower urinary tract symptoms and acute urinary retention. I discussed with the patient the need to check his PSA to assess his prostate cancer risk. We discussed at length the mechanism of action, risk, benefit, adverse events and side effect of alpha-blocker in the form of tamsulosin 0.4 mg p.o nightly. We discussed in particular the risk of hypotension, lightheadedness, dizziness, and the risk of fall and bone fracture. Also discussed retrograde ejaculation of the side effects of the medication. We had another discussion with the patient regarding lifestyle modifications including low fluid intake after 5 PM, timed voiding every 2 hours, and decrease caffeine intake. I discussed with the patient that in case he had an elevated PSA, we will proceed do an MRI of the prostate.     Plan:  - Renal US and KUB in 1 year     E&M visit today is associated with current or anticipated ongoing medical care services related to a patient's single, serious condition or a complex condition.      2/4/2025    Scribe Attestation  By signing my name below, I, Samantha Sterling attest that this documentation has been prepared under the direction and in the presence of Dr. Juan C March.

## 2025-02-01 ENCOUNTER — HOSPITAL ENCOUNTER (OUTPATIENT)
Dept: RADIOLOGY | Facility: HOSPITAL | Age: 55
Discharge: HOME | End: 2025-02-01
Payer: COMMERCIAL

## 2025-02-01 DIAGNOSIS — N20.0 KIDNEY STONE: ICD-10-CM

## 2025-02-01 PROCEDURE — 76770 US EXAM ABDO BACK WALL COMP: CPT

## 2025-02-01 PROCEDURE — 76770 US EXAM ABDO BACK WALL COMP: CPT | Performed by: STUDENT IN AN ORGANIZED HEALTH CARE EDUCATION/TRAINING PROGRAM

## 2025-02-04 ENCOUNTER — TELEMEDICINE (OUTPATIENT)
Dept: UROLOGY | Facility: HOSPITAL | Age: 55
End: 2025-02-04
Payer: COMMERCIAL

## 2025-02-04 ENCOUNTER — APPOINTMENT (OUTPATIENT)
Dept: PHYSICAL THERAPY | Facility: CLINIC | Age: 55
End: 2025-02-04
Payer: COMMERCIAL

## 2025-02-04 DIAGNOSIS — Z12.5 SCREENING PSA (PROSTATE SPECIFIC ANTIGEN): Primary | ICD-10-CM

## 2025-02-04 PROCEDURE — G2211 COMPLEX E/M VISIT ADD ON: HCPCS | Performed by: STUDENT IN AN ORGANIZED HEALTH CARE EDUCATION/TRAINING PROGRAM

## 2025-02-04 PROCEDURE — 99214 OFFICE O/P EST MOD 30 MIN: CPT | Performed by: STUDENT IN AN ORGANIZED HEALTH CARE EDUCATION/TRAINING PROGRAM

## 2025-02-12 ENCOUNTER — APPOINTMENT (OUTPATIENT)
Dept: PHYSICAL THERAPY | Facility: CLINIC | Age: 55
End: 2025-02-12
Payer: COMMERCIAL

## 2025-02-16 DIAGNOSIS — E78.5 HYPERLIPIDEMIA, UNSPECIFIED HYPERLIPIDEMIA TYPE: ICD-10-CM

## 2025-02-17 RX ORDER — ATORVASTATIN CALCIUM 20 MG/1
20 TABLET, FILM COATED ORAL NIGHTLY
Qty: 90 TABLET | Refills: 0 | Status: SHIPPED | OUTPATIENT
Start: 2025-02-17

## 2025-02-24 ENCOUNTER — APPOINTMENT (OUTPATIENT)
Dept: DERMATOLOGY | Facility: CLINIC | Age: 55
End: 2025-02-24
Payer: COMMERCIAL

## 2025-03-23 DIAGNOSIS — F41.1 GAD (GENERALIZED ANXIETY DISORDER): ICD-10-CM

## 2025-03-24 RX ORDER — SERTRALINE HYDROCHLORIDE 50 MG/1
50 TABLET, FILM COATED ORAL DAILY
Qty: 30 TABLET | Refills: 11 | Status: SHIPPED | OUTPATIENT
Start: 2025-03-24

## 2025-04-07 ENCOUNTER — APPOINTMENT (OUTPATIENT)
Dept: CARDIOLOGY | Facility: CLINIC | Age: 55
End: 2025-04-07
Payer: COMMERCIAL

## 2025-04-09 ENCOUNTER — TELEPHONE (OUTPATIENT)
Dept: PRIMARY CARE | Facility: CLINIC | Age: 55
End: 2025-04-09

## 2025-04-18 ENCOUNTER — OFFICE VISIT (OUTPATIENT)
Dept: CARDIOLOGY | Facility: CLINIC | Age: 55
End: 2025-04-18
Payer: COMMERCIAL

## 2025-04-18 VITALS
DIASTOLIC BLOOD PRESSURE: 73 MMHG | HEIGHT: 67 IN | WEIGHT: 153 LBS | OXYGEN SATURATION: 98 % | HEART RATE: 56 BPM | BODY MASS INDEX: 24.01 KG/M2 | SYSTOLIC BLOOD PRESSURE: 115 MMHG

## 2025-04-18 DIAGNOSIS — I25.10 CORONARY ARTERY DISEASE INVOLVING NATIVE CORONARY ARTERY OF NATIVE HEART WITHOUT ANGINA PECTORIS: Primary | ICD-10-CM

## 2025-04-18 DIAGNOSIS — E78.5 HYPERLIPIDEMIA, UNSPECIFIED HYPERLIPIDEMIA TYPE: ICD-10-CM

## 2025-04-18 PROCEDURE — 1036F TOBACCO NON-USER: CPT | Performed by: NURSE PRACTITIONER

## 2025-04-18 PROCEDURE — 99214 OFFICE O/P EST MOD 30 MIN: CPT | Performed by: NURSE PRACTITIONER

## 2025-04-18 PROCEDURE — 3008F BODY MASS INDEX DOCD: CPT | Performed by: NURSE PRACTITIONER

## 2025-04-18 ASSESSMENT — ENCOUNTER SYMPTOMS
GASTROINTESTINAL NEGATIVE: 1
MUSCULOSKELETAL NEGATIVE: 1
CARDIOVASCULAR NEGATIVE: 1
RESPIRATORY NEGATIVE: 1
CONSTITUTIONAL NEGATIVE: 1
OCCASIONAL FEELINGS OF UNSTEADINESS: 0
LOSS OF SENSATION IN FEET: 0
DEPRESSION: 0
NEUROLOGICAL NEGATIVE: 1

## 2025-04-18 ASSESSMENT — COLUMBIA-SUICIDE SEVERITY RATING SCALE - C-SSRS
2. HAVE YOU ACTUALLY HAD ANY THOUGHTS OF KILLING YOURSELF?: NO
1. IN THE PAST MONTH, HAVE YOU WISHED YOU WERE DEAD OR WISHED YOU COULD GO TO SLEEP AND NOT WAKE UP?: NO
6. HAVE YOU EVER DONE ANYTHING, STARTED TO DO ANYTHING, OR PREPARED TO DO ANYTHING TO END YOUR LIFE?: NO

## 2025-04-18 ASSESSMENT — PAIN SCALES - GENERAL: PAINLEVEL_OUTOF10: 0-NO PAIN

## 2025-04-18 ASSESSMENT — PATIENT HEALTH QUESTIONNAIRE - PHQ9
SUM OF ALL RESPONSES TO PHQ9 QUESTIONS 1 AND 2: 0
2. FEELING DOWN, DEPRESSED OR HOPELESS: NOT AT ALL
1. LITTLE INTEREST OR PLEASURE IN DOING THINGS: NOT AT ALL

## 2025-04-18 NOTE — PROGRESS NOTES
"Chief Complaint:   CAD    History Of Present Illness:    .Mr Cano returns in follow up. Denies chest pain, sob, palpitations or pedal edema.  We will repeat stress testing early next year and CT coronary artery score later in 2026.  Labs as ordered by pcp.              Last Recorded Vitals:  Blood pressure 115/73, pulse 56, height 1.702 m (5' 7\"), weight 69.4 kg (153 lb), SpO2 98%.     Past Medical History:  Medical History[1]     Past Surgical History:  Surgical History[2]    Social History:  Social History[3]    Family History:  Family History[4]      Allergies:  Patient has no known allergies.    Outpatient Medications:  Current Medications[5]     Physical Exam:  Cardiovascular:      PMI at left midclavicular line. Normal rate. Regular rhythm. Normal S1. Normal S2.       Murmurs: There is no murmur.      No gallop.  No click. No rub.   Pulses:     Intact distal pulses.   Edema:     Peripheral edema absent.         ROS:  Review of Systems   Constitutional: Negative.   Cardiovascular: Negative.    Respiratory: Negative.     Musculoskeletal: Negative.    Gastrointestinal: Negative.    Genitourinary: Negative.    Neurological: Negative.           Last Labs: reviewed  CBC -  Lab Results   Component Value Date    WBC 5.8 05/01/2024    HGB 14.6 05/01/2024    HCT 42.8 05/01/2024    MCV 98 05/01/2024     05/01/2024       CMP -  Lab Results   Component Value Date    CALCIUM 9.3 05/01/2024    PROT 6.9 04/13/2024    ALBUMIN 4.7 04/13/2024    AST 26 04/13/2024    ALT 24 04/13/2024    ALKPHOS 63 04/13/2024    BILITOT 0.8 04/13/2024       LIPID PANEL -   Lab Results   Component Value Date    CHOL 122 04/13/2024    TRIG 50 04/13/2024    HDL 56.0 04/13/2024    CHHDL 2.2 04/13/2024    LDLF 47 03/18/2023    VLDL 10 04/13/2024    NHDL 66 04/13/2024       RENAL FUNCTION PANEL -   Lab Results   Component Value Date    GLUCOSE 86 05/01/2024     05/01/2024    K 4.3 05/01/2024     05/01/2024    CO2 29 05/01/2024    " ANIONGAP 11 05/01/2024    BUN 25 (H) 05/01/2024    CREATININE 1.06 05/01/2024    GFRMALE 89 03/18/2023    CALCIUM 9.3 05/01/2024    ALBUMIN 4.7 04/13/2024        Lab Results   Component Value Date    HGBA1C 5.1 04/13/2024         Assessment/Plan   Problem List Items Addressed This Visit    None      1. Coronary artery disease. This patient does have a family history of coronary artery disease with his father having had stents deployed and even a CVA in his early 50s. The patient has a twin brother who had a high CT coronary calcium test and a subsequent cardiac catheterization which demonstrated low-grade coronary artery disease. The patient himself underwent an exercise treadmill stress echocardiogram on 11/13/2015 during which she was able to complete 17 minutes of the Abdirhaman protocol with no evidence of ischemia. Despite these results he was encouraged also have a CT coronary calcium score test performed. This was done on 02/09/2016 with a result of 367 confirming the presence of CAD. His lipid panel performed on 07/12/2015 included a cholesterol 178  HDL 53 triglycerides 69. Given the value of his CT coronary calcium score he was started on aspirin 81 mg daily and atorvastatin 10 mg daily despite the negativity of the stress echo. He did have a fasting lipid panel performed on 08/22/2016 with cholesterol 124 LDL 66 HDL 46 triglyceride 60. SMA panel was normal. CBC included hematocrit 39.3. TSH was 2.46 free thyroxine 1.20. The patient remains asymptomatic not having been seen for approximately 4 years. He had a negative exercise nuclear stress test done 11/2020. Will repeat stress testing in 9 months.     2. Palpations. The patient has a history of cardiac palpitations that began back in May of 2022. The patient reported that his palpitations have improved on Metop ER of 25 MG. He will continue this medication.      3. Positive family history of CAD.     4. Mild hyperlipidemia. Given the findings of the CT  coronary calcium score, the patient was started on atorvastatin 10 mg daily despite the fact that his baseline lipid readings are only modestly elevated. He did have a good response to the atorvastatin 10 mg daily as per the lab work on 08/22/2016. He had repeat lipid levels drawn 04/2024 resulting Total cholesterol 122, HDL of 56, LDL of 56, and TGL of 50. Continue atorvastatin 20mg.      5. Obstructive sleep apnea. The patient did have a sleep study on 06/27/2015 confirming the presence of sleep apnea.     6. Status post inguinal hernia repair     7. History of rotator cuff repair.     8. Complete right bundle branch block conduction delay.     9. Status post cystoscopy right ureteroscopy and laser lithotripsy and right ureteral stent for nephrolithiasis 1/29/2018        Sarah Beth Downing, APRN-CNP       [1]   Past Medical History:  Diagnosis Date    Abnormal Heart Score CT     2/19/16 was 367 -  f/u NST 2020 normal    BPH (benign prostatic hyperplasia)     MAURICIO (generalized anxiety disorder)     HLD (hyperlipidemia)     mild, on statin    Insomnia     Kidney stones     ONEIDA (obstructive sleep apnea)     mouthpiece no CPAP    Palpitations     managed on Metoprolol per cardiology note 7/21/23 with Sarah Beth Downing    Right bundle branch block    [2]   Past Surgical History:  Procedure Laterality Date    EXTRACORPOREAL SHOCK WAVE LITHOTRIPSY Left 05/28/2024    [facility]    HERNIA REPAIR Left     inguinal    LITHOTRIPSY  2018    MR HEAD ANGIO WO IV CONTRAST  08/22/2013    MR HEAD ANGIO WO IV CONTRAST [facility]    MR NECK ANGIO WO IV CONTRAST  08/22/2013    MR NECK ANGIO WO IV CONTRAST [facility]    ROTATOR CUFF REPAIR Right    [3]   Social History  Socioeconomic History    Marital status:    Tobacco Use    Smoking status: Never     Passive exposure: Never    Smokeless tobacco: Never   Vaping Use    Vaping status: Never Used   Substance and Sexual Activity    Alcohol use: Not Currently      Alcohol/week: 2.0 standard drinks of alcohol     Types: 2 Standard drinks or equivalent per week    Drug use: Never    Sexual activity: Defer   [4]   Family History  Problem Relation Name Age of Onset    Heart attack Mother      Other (cva) Father     [5]   Current Outpatient Medications   Medication Sig Dispense Refill    aspirin 81 mg EC tablet Take 1 tablet (81 mg) by mouth once daily.      atorvastatin (Lipitor) 20 mg tablet TAKE ONE TABLET BY MOUTH AT BEDTIME 90 tablet 0    metoprolol succinate XL (Toprol-XL) 25 mg 24 hr tablet TAKE ONE TABLET BY MOUTH EVERY DAY 90 tablet 0    sertraline (Zoloft) 25 mg tablet TAKE ONE TABLET BY MOUTH once DAILY. TAKE WITH ZOLOFT 50 MG TABLET TO EQUAL 75 MG DAILY 90 tablet 3    sertraline (Zoloft) 50 mg tablet TAKE ONE TABLET BY MOUTH ONCE DAILY 30 tablet 11    traZODone (Desyrel) 100 mg tablet Take 1 tablet (100 mg) by mouth as needed at bedtime for sleep. 90 tablet 3    chlorhexidine (Peridex) 0.12 % solution 15 ml swish and spit for 30 seconds night prior to surgery and morning of surgery (Patient not taking: Reported on 10/23/2024) 473 mL 0    sod sulf-pot chloride-mag sulf (Sutab) 1.479-0.188- 0.225 gram tablet Take as directed. (Patient not taking: Reported on 10/23/2024) 24 tablet 0     No current facility-administered medications for this visit.

## 2025-04-20 LAB
ALBUMIN SERPL-MCNC: 4.5 G/DL (ref 3.6–5.1)
ALP SERPL-CCNC: 71 U/L (ref 35–144)
ALT SERPL-CCNC: 21 U/L (ref 9–46)
ANION GAP SERPL CALCULATED.4IONS-SCNC: 8 MMOL/L (CALC) (ref 7–17)
AST SERPL-CCNC: 28 U/L (ref 10–35)
BASOPHILS # BLD AUTO: 41 CELLS/UL (ref 0–200)
BASOPHILS NFR BLD AUTO: 0.9 %
BILIRUB SERPL-MCNC: 0.9 MG/DL (ref 0.2–1.2)
BUN SERPL-MCNC: 25 MG/DL (ref 7–25)
CALCIUM SERPL-MCNC: 9.3 MG/DL (ref 8.6–10.3)
CHLORIDE SERPL-SCNC: 104 MMOL/L (ref 98–110)
CHOLEST SERPL-MCNC: 122 MG/DL
CHOLEST/HDLC SERPL: 2.3 (CALC)
CO2 SERPL-SCNC: 28 MMOL/L (ref 20–32)
CREAT SERPL-MCNC: 1.1 MG/DL (ref 0.7–1.3)
EGFRCR SERPLBLD CKD-EPI 2021: 80 ML/MIN/1.73M2
EOSINOPHIL # BLD AUTO: 149 CELLS/UL (ref 15–500)
EOSINOPHIL NFR BLD AUTO: 3.3 %
ERYTHROCYTE [DISTWIDTH] IN BLOOD BY AUTOMATED COUNT: 12.6 % (ref 11–15)
EST. AVERAGE GLUCOSE BLD GHB EST-MCNC: 114 MG/DL
EST. AVERAGE GLUCOSE BLD GHB EST-SCNC: 6.3 MMOL/L
GLUCOSE SERPL-MCNC: 96 MG/DL (ref 65–99)
HBA1C MFR BLD: 5.6 %
HCT VFR BLD AUTO: 42.8 % (ref 38.5–50)
HDLC SERPL-MCNC: 54 MG/DL
HGB BLD-MCNC: 14.6 G/DL (ref 13.2–17.1)
LDLC SERPL CALC-MCNC: 54 MG/DL (CALC)
LYMPHOCYTES # BLD AUTO: 2286 CELLS/UL (ref 850–3900)
LYMPHOCYTES NFR BLD AUTO: 50.8 %
MCH RBC QN AUTO: 34.4 PG (ref 27–33)
MCHC RBC AUTO-ENTMCNC: 34.1 G/DL (ref 32–36)
MCV RBC AUTO: 100.9 FL (ref 80–100)
MONOCYTES # BLD AUTO: 518 CELLS/UL (ref 200–950)
MONOCYTES NFR BLD AUTO: 11.5 %
NEUTROPHILS # BLD AUTO: 1508 CELLS/UL (ref 1500–7800)
NEUTROPHILS NFR BLD AUTO: 33.5 %
NONHDLC SERPL-MCNC: 68 MG/DL (CALC)
PLATELET # BLD AUTO: 180 THOUSAND/UL (ref 140–400)
PMV BLD REES-ECKER: 9.9 FL (ref 7.5–12.5)
POTASSIUM SERPL-SCNC: 4.6 MMOL/L (ref 3.5–5.3)
PROT SERPL-MCNC: 6.8 G/DL (ref 6.1–8.1)
PSA SERPL-MCNC: 0.76 NG/ML
RBC # BLD AUTO: 4.24 MILLION/UL (ref 4.2–5.8)
SODIUM SERPL-SCNC: 140 MMOL/L (ref 135–146)
TRIGL SERPL-MCNC: 61 MG/DL
TSH SERPL-ACNC: 1.84 MIU/L (ref 0.4–4.5)
WBC # BLD AUTO: 4.5 THOUSAND/UL (ref 3.8–10.8)

## 2025-04-23 ENCOUNTER — APPOINTMENT (OUTPATIENT)
Dept: PRIMARY CARE | Facility: CLINIC | Age: 55
End: 2025-04-23
Payer: COMMERCIAL

## 2025-04-23 VITALS
WEIGHT: 155 LBS | HEIGHT: 67 IN | BODY MASS INDEX: 24.33 KG/M2 | HEART RATE: 61 BPM | DIASTOLIC BLOOD PRESSURE: 72 MMHG | TEMPERATURE: 97.2 F | SYSTOLIC BLOOD PRESSURE: 106 MMHG | OXYGEN SATURATION: 96 % | RESPIRATION RATE: 16 BRPM

## 2025-04-23 DIAGNOSIS — F41.1 GAD (GENERALIZED ANXIETY DISORDER): ICD-10-CM

## 2025-04-23 DIAGNOSIS — R00.2 PALPITATIONS: ICD-10-CM

## 2025-04-23 DIAGNOSIS — Z00.00 ANNUAL PHYSICAL EXAM: Primary | ICD-10-CM

## 2025-04-23 DIAGNOSIS — G95.89 CERVICAL SPINAL MASS: ICD-10-CM

## 2025-04-23 DIAGNOSIS — E78.5 HYPERLIPIDEMIA, UNSPECIFIED HYPERLIPIDEMIA TYPE: ICD-10-CM

## 2025-04-23 PROCEDURE — 99396 PREV VISIT EST AGE 40-64: CPT | Performed by: FAMILY MEDICINE

## 2025-04-23 PROCEDURE — 99213 OFFICE O/P EST LOW 20 MIN: CPT | Performed by: FAMILY MEDICINE

## 2025-04-23 PROCEDURE — 1036F TOBACCO NON-USER: CPT | Performed by: FAMILY MEDICINE

## 2025-04-23 PROCEDURE — 3008F BODY MASS INDEX DOCD: CPT | Performed by: FAMILY MEDICINE

## 2025-04-23 RX ORDER — METOPROLOL SUCCINATE 25 MG/1
25 TABLET, EXTENDED RELEASE ORAL DAILY
Qty: 90 TABLET | Refills: 3 | Status: SHIPPED | OUTPATIENT
Start: 2025-04-23

## 2025-04-23 RX ORDER — ATORVASTATIN CALCIUM 20 MG/1
20 TABLET, FILM COATED ORAL NIGHTLY
Qty: 90 TABLET | Refills: 3 | Status: SHIPPED | OUTPATIENT
Start: 2025-04-23

## 2025-04-23 NOTE — PROGRESS NOTES
"Subjective   Himanshu Cano is a 54 y.o. male who presents for Annual Exam.  HPI  PMH:  Strong Fhx CAD  RBBB-cards   Ca score 367 2016  HLD  Nml c-scope 3/2022 rpt 10 yr   Lithotripsy w stent 2018, 5/2024  MAURICIO-zoloft 9/2023  shingrix UTD   Lung nodules 4/2024  Rotator cuff surg   Inguinal hernia repair     Here for CPE     Has mild pain in neck. Did PT did not help. Pain 2-3/10. Standing at work helps. No arm pain numbness or tingling.     Very active, eats healthy    Mood good w zoloft    Saw cards recently and roxie stress test for later this yr     Medications Ordered Prior to Encounter[1]               Objective   /72   Pulse 61   Temp 36.2 °C (97.2 °F)   Resp 16   Ht 1.702 m (5' 7\")   Wt 70.3 kg (155 lb)   SpO2 96%   BMI 24.28 kg/m²    Physical Exam  General: NAD  HEENT:NCAT, PERRLA, nml OP, R TM cerumen L TM clear   Neck: no cervical GHISLAINE  Heart: RRR no murmur, no edema   Lungs: CTA b/l, no wheeze or rhonchi   GI: abd soft, nontender, nondistended.   MSK: no c/c/e. nml gait   CERV LORDOSIS, PROMINENT LOWER C SPINE MASS NONTENDER  Skin: warm and dry  Psych: cooperative, appropriate affect  Neuro: speech clear. A&Ox3  Assessment/Plan   Problem List Items Addressed This Visit       Hyperlipidemia  Lipids at goal on lipitor 20, refilled       Relevant Medications    atorvastatin (Lipitor) 20 mg tablet     Other Visit Diagnoses         Annual physical exam    -  Primary  CPE:overall doing well. Cont balanced diet/reg ex   BMI: 24  VACC: reviewed tdap/shingrix UTD consider PNA   COLON CA SCRN: UTD  LABS: reviewed w pt at goal   Prostate ca scn: PSA nml   RTC yearly or prn       MAURICIO (generalized anxiety disorder)      Stable on zoloft 75         Palpitiations:   RF metoprolol   Stable       Relevant Medications    metoprolol succinate XL (Toprol-XL) 25 mg 24 hr tablet      Cervical spinal mass      Neck xray neg  PT not helpful  Further characterize w MRI       Relevant Orders    MR cervical spine w " and wo IV contrast                 [1]   Current Outpatient Medications on File Prior to Visit   Medication Sig Dispense Refill    aspirin 81 mg EC tablet Take 1 tablet (81 mg) by mouth once daily.      sertraline (Zoloft) 25 mg tablet TAKE ONE TABLET BY MOUTH once DAILY. TAKE WITH ZOLOFT 50 MG TABLET TO EQUAL 75 MG DAILY 90 tablet 3    sertraline (Zoloft) 50 mg tablet TAKE ONE TABLET BY MOUTH ONCE DAILY 30 tablet 11    traZODone (Desyrel) 100 mg tablet Take 1 tablet (100 mg) by mouth as needed at bedtime for sleep. 90 tablet 3    [DISCONTINUED] atorvastatin (Lipitor) 20 mg tablet TAKE ONE TABLET BY MOUTH AT BEDTIME 90 tablet 0    [DISCONTINUED] metoprolol succinate XL (Toprol-XL) 25 mg 24 hr tablet TAKE ONE TABLET BY MOUTH EVERY DAY 90 tablet 0    [DISCONTINUED] chlorhexidine (Peridex) 0.12 % solution 15 ml swish and spit for 30 seconds night prior to surgery and morning of surgery (Patient not taking: Reported on 10/23/2024) 473 mL 0    [DISCONTINUED] sod sulf-pot chloride-mag sulf (Sutab) 1.479-0.188- 0.225 gram tablet Take as directed. (Patient not taking: Reported on 10/23/2024) 24 tablet 0     No current facility-administered medications on file prior to visit.

## 2025-05-04 DIAGNOSIS — Z12.5 SCREENING PSA (PROSTATE SPECIFIC ANTIGEN): ICD-10-CM

## 2025-08-26 DIAGNOSIS — G47.00 INSOMNIA, UNSPECIFIED TYPE: ICD-10-CM

## 2025-08-27 RX ORDER — TRAZODONE HYDROCHLORIDE 100 MG/1
100 TABLET ORAL NIGHTLY PRN
Qty: 90 TABLET | Refills: 3 | Status: SHIPPED | OUTPATIENT
Start: 2025-08-27